# Patient Record
Sex: FEMALE | Race: WHITE | NOT HISPANIC OR LATINO | Employment: OTHER | URBAN - METROPOLITAN AREA
[De-identification: names, ages, dates, MRNs, and addresses within clinical notes are randomized per-mention and may not be internally consistent; named-entity substitution may affect disease eponyms.]

---

## 2019-01-01 ENCOUNTER — PATIENT OUTREACH (OUTPATIENT)
Dept: OTHER | Facility: MEDICAL CENTER | Age: 69
End: 2019-01-01

## 2019-01-01 ENCOUNTER — APPOINTMENT (OUTPATIENT)
Dept: RADIOLOGY | Facility: MEDICAL CENTER | Age: 69
End: 2019-01-01
Attending: EMERGENCY MEDICINE
Payer: MEDICARE

## 2019-01-01 ENCOUNTER — OUTPATIENT INFUSION SERVICES (OUTPATIENT)
Dept: ONCOLOGY | Facility: MEDICAL CENTER | Age: 69
End: 2019-01-01
Attending: INTERNAL MEDICINE
Payer: MEDICARE

## 2019-01-01 ENCOUNTER — HOSPITAL ENCOUNTER (EMERGENCY)
Facility: MEDICAL CENTER | Age: 69
End: 2019-10-02
Attending: EMERGENCY MEDICINE
Payer: MEDICARE

## 2019-01-01 ENCOUNTER — PATIENT OUTREACH (OUTPATIENT)
Dept: HEALTH INFORMATION MANAGEMENT | Facility: OTHER | Age: 69
End: 2019-01-01

## 2019-01-01 ENCOUNTER — HOSPITAL ENCOUNTER (OUTPATIENT)
Facility: MEDICAL CENTER | Age: 69
End: 2019-08-16
Attending: INTERNAL MEDICINE
Payer: MEDICARE

## 2019-01-01 ENCOUNTER — HOSPITAL ENCOUNTER (INPATIENT)
Facility: MEDICAL CENTER | Age: 69
LOS: 6 days | DRG: 683 | End: 2019-09-20
Attending: EMERGENCY MEDICINE | Admitting: HOSPITALIST
Payer: MEDICARE

## 2019-01-01 VITALS
HEIGHT: 60 IN | TEMPERATURE: 98.1 F | DIASTOLIC BLOOD PRESSURE: 60 MMHG | SYSTOLIC BLOOD PRESSURE: 131 MMHG | HEART RATE: 78 BPM | RESPIRATION RATE: 18 BRPM | BODY MASS INDEX: 20.65 KG/M2 | OXYGEN SATURATION: 98 % | WEIGHT: 105.16 LBS

## 2019-01-01 VITALS
WEIGHT: 106.26 LBS | RESPIRATION RATE: 18 BRPM | HEIGHT: 60 IN | DIASTOLIC BLOOD PRESSURE: 67 MMHG | HEART RATE: 65 BPM | SYSTOLIC BLOOD PRESSURE: 92 MMHG | BODY MASS INDEX: 20.86 KG/M2 | OXYGEN SATURATION: 98 % | TEMPERATURE: 97 F

## 2019-01-01 VITALS
TEMPERATURE: 97 F | DIASTOLIC BLOOD PRESSURE: 62 MMHG | RESPIRATION RATE: 18 BRPM | BODY MASS INDEX: 22.16 KG/M2 | HEIGHT: 60 IN | WEIGHT: 112.88 LBS | OXYGEN SATURATION: 96 % | HEART RATE: 73 BPM | SYSTOLIC BLOOD PRESSURE: 129 MMHG

## 2019-01-01 VITALS
HEART RATE: 113 BPM | HEIGHT: 60 IN | BODY MASS INDEX: 20.04 KG/M2 | SYSTOLIC BLOOD PRESSURE: 134 MMHG | DIASTOLIC BLOOD PRESSURE: 73 MMHG | TEMPERATURE: 98.8 F | OXYGEN SATURATION: 98 % | WEIGHT: 102.07 LBS | RESPIRATION RATE: 18 BRPM

## 2019-01-01 VITALS
HEART RATE: 123 BPM | BODY MASS INDEX: 21.6 KG/M2 | DIASTOLIC BLOOD PRESSURE: 49 MMHG | RESPIRATION RATE: 18 BRPM | SYSTOLIC BLOOD PRESSURE: 105 MMHG | TEMPERATURE: 97.3 F | OXYGEN SATURATION: 96 % | HEIGHT: 60 IN | WEIGHT: 110 LBS

## 2019-01-01 VITALS
HEART RATE: 101 BPM | SYSTOLIC BLOOD PRESSURE: 152 MMHG | BODY MASS INDEX: 21.64 KG/M2 | RESPIRATION RATE: 18 BRPM | OXYGEN SATURATION: 98 % | WEIGHT: 110.23 LBS | DIASTOLIC BLOOD PRESSURE: 72 MMHG | TEMPERATURE: 98 F | HEIGHT: 60 IN

## 2019-01-01 VITALS
RESPIRATION RATE: 17 BRPM | DIASTOLIC BLOOD PRESSURE: 67 MMHG | HEART RATE: 73 BPM | HEIGHT: 60 IN | BODY MASS INDEX: 21.99 KG/M2 | TEMPERATURE: 98.4 F | WEIGHT: 111.99 LBS | SYSTOLIC BLOOD PRESSURE: 140 MMHG | OXYGEN SATURATION: 96 %

## 2019-01-01 VITALS
OXYGEN SATURATION: 100 % | SYSTOLIC BLOOD PRESSURE: 128 MMHG | HEART RATE: 62 BPM | DIASTOLIC BLOOD PRESSURE: 58 MMHG | HEIGHT: 60 IN | BODY MASS INDEX: 20.3 KG/M2 | RESPIRATION RATE: 18 BRPM | TEMPERATURE: 98.4 F | WEIGHT: 103.4 LBS

## 2019-01-01 VITALS
HEART RATE: 47 BPM | DIASTOLIC BLOOD PRESSURE: 45 MMHG | SYSTOLIC BLOOD PRESSURE: 137 MMHG | OXYGEN SATURATION: 99 % | RESPIRATION RATE: 18 BRPM | WEIGHT: 108.25 LBS | HEIGHT: 60 IN | TEMPERATURE: 97.2 F | BODY MASS INDEX: 21.25 KG/M2

## 2019-01-01 VITALS
BODY MASS INDEX: 20.86 KG/M2 | OXYGEN SATURATION: 98 % | HEART RATE: 53 BPM | DIASTOLIC BLOOD PRESSURE: 56 MMHG | SYSTOLIC BLOOD PRESSURE: 118 MMHG | HEIGHT: 60 IN | WEIGHT: 106.26 LBS | RESPIRATION RATE: 18 BRPM | TEMPERATURE: 97.6 F

## 2019-01-01 VITALS
OXYGEN SATURATION: 100 % | DIASTOLIC BLOOD PRESSURE: 80 MMHG | HEIGHT: 60 IN | RESPIRATION RATE: 18 BRPM | SYSTOLIC BLOOD PRESSURE: 129 MMHG | TEMPERATURE: 98 F | HEART RATE: 121 BPM | BODY MASS INDEX: 18.74 KG/M2 | WEIGHT: 95.46 LBS

## 2019-01-01 VITALS
OXYGEN SATURATION: 100 % | DIASTOLIC BLOOD PRESSURE: 48 MMHG | BODY MASS INDEX: 20.82 KG/M2 | HEIGHT: 60 IN | RESPIRATION RATE: 18 BRPM | TEMPERATURE: 97.4 F | WEIGHT: 106.04 LBS | SYSTOLIC BLOOD PRESSURE: 164 MMHG | HEART RATE: 70 BPM

## 2019-01-01 VITALS
SYSTOLIC BLOOD PRESSURE: 148 MMHG | DIASTOLIC BLOOD PRESSURE: 55 MMHG | HEART RATE: 49 BPM | RESPIRATION RATE: 18 BRPM | TEMPERATURE: 97.9 F | OXYGEN SATURATION: 100 %

## 2019-01-01 VITALS
WEIGHT: 100.75 LBS | DIASTOLIC BLOOD PRESSURE: 70 MMHG | BODY MASS INDEX: 19.78 KG/M2 | TEMPERATURE: 98.2 F | HEART RATE: 76 BPM | SYSTOLIC BLOOD PRESSURE: 132 MMHG | OXYGEN SATURATION: 94 % | RESPIRATION RATE: 18 BRPM | HEIGHT: 60 IN

## 2019-01-01 VITALS
WEIGHT: 102.29 LBS | SYSTOLIC BLOOD PRESSURE: 118 MMHG | RESPIRATION RATE: 18 BRPM | HEIGHT: 60 IN | HEART RATE: 90 BPM | BODY MASS INDEX: 20.08 KG/M2 | OXYGEN SATURATION: 98 % | TEMPERATURE: 98 F | DIASTOLIC BLOOD PRESSURE: 46 MMHG

## 2019-01-01 DIAGNOSIS — C77.2 COLON CANCER METASTASIZED TO INTRA-ABDOMINAL LYMPH NODE (HCC): ICD-10-CM

## 2019-01-01 DIAGNOSIS — E86.0 DEHYDRATION: ICD-10-CM

## 2019-01-01 DIAGNOSIS — C18.9 COLON CANCER METASTASIZED TO INTRA-ABDOMINAL LYMPH NODE (HCC): ICD-10-CM

## 2019-01-01 DIAGNOSIS — Z85.038 PERSONAL HISTORY OF COLON CANCER, STAGE IV: ICD-10-CM

## 2019-01-01 DIAGNOSIS — R19.7 DIARRHEA, UNSPECIFIED TYPE: ICD-10-CM

## 2019-01-01 DIAGNOSIS — I95.9 HYPOTENSION, UNSPECIFIED HYPOTENSION TYPE: ICD-10-CM

## 2019-01-01 DIAGNOSIS — R11.2 INTRACTABLE VOMITING WITH NAUSEA, UNSPECIFIED VOMITING TYPE: ICD-10-CM

## 2019-01-01 DIAGNOSIS — R53.1 WEAKNESS: ICD-10-CM

## 2019-01-01 LAB
ALBUMIN SERPL BCP-MCNC: 2.5 G/DL (ref 3.2–4.9)
ALBUMIN SERPL BCP-MCNC: 2.6 G/DL (ref 3.2–4.9)
ALBUMIN SERPL BCP-MCNC: 2.7 G/DL (ref 3.2–4.9)
ALBUMIN SERPL BCP-MCNC: 2.9 G/DL (ref 3.2–4.9)
ALBUMIN SERPL BCP-MCNC: 2.9 G/DL (ref 3.2–4.9)
ALBUMIN SERPL BCP-MCNC: 3 G/DL (ref 3.2–4.9)
ALBUMIN SERPL BCP-MCNC: 3.1 G/DL (ref 3.2–4.9)
ALBUMIN SERPL BCP-MCNC: 3.3 G/DL (ref 3.2–4.9)
ALBUMIN SERPL BCP-MCNC: 3.5 G/DL (ref 3.2–4.9)
ALBUMIN SERPL BCP-MCNC: 3.6 G/DL (ref 3.2–4.9)
ALBUMIN SERPL BCP-MCNC: 3.7 G/DL (ref 3.2–4.9)
ALBUMIN SERPL BCP-MCNC: 3.8 G/DL (ref 3.2–4.9)
ALBUMIN SERPL BCP-MCNC: 3.9 G/DL (ref 3.2–4.9)
ALBUMIN SERPL BCP-MCNC: 4.5 G/DL (ref 3.2–4.9)
ALBUMIN/GLOB SERPL: 1.1 G/DL
ALBUMIN/GLOB SERPL: 1.2 G/DL
ALBUMIN/GLOB SERPL: 1.4 G/DL
ALBUMIN/GLOB SERPL: 1.5 G/DL
ALBUMIN/GLOB SERPL: 1.6 G/DL
ALBUMIN/GLOB SERPL: 1.7 G/DL
ALBUMIN/GLOB SERPL: 1.7 G/DL
ALP SERPL-CCNC: 100 U/L (ref 30–99)
ALP SERPL-CCNC: 101 U/L (ref 30–99)
ALP SERPL-CCNC: 105 U/L (ref 30–99)
ALP SERPL-CCNC: 106 U/L (ref 30–99)
ALP SERPL-CCNC: 113 U/L (ref 30–99)
ALP SERPL-CCNC: 142 U/L (ref 30–99)
ALP SERPL-CCNC: 155 U/L (ref 30–99)
ALP SERPL-CCNC: 159 U/L (ref 30–99)
ALP SERPL-CCNC: 87 U/L (ref 30–99)
ALP SERPL-CCNC: 88 U/L (ref 30–99)
ALP SERPL-CCNC: 88 U/L (ref 30–99)
ALP SERPL-CCNC: 92 U/L (ref 30–99)
ALP SERPL-CCNC: 92 U/L (ref 30–99)
ALP SERPL-CCNC: 97 U/L (ref 30–99)
ALT SERPL-CCNC: 10 U/L (ref 2–50)
ALT SERPL-CCNC: 11 U/L (ref 2–50)
ALT SERPL-CCNC: 5 U/L (ref 2–50)
ALT SERPL-CCNC: 6 U/L (ref 2–50)
ALT SERPL-CCNC: 6 U/L (ref 2–50)
ALT SERPL-CCNC: 8 U/L (ref 2–50)
ALT SERPL-CCNC: <5 U/L (ref 2–50)
ANION GAP SERPL CALC-SCNC: 10 MMOL/L (ref 0–11.9)
ANION GAP SERPL CALC-SCNC: 11 MMOL/L (ref 0–11.9)
ANION GAP SERPL CALC-SCNC: 11 MMOL/L (ref 0–11.9)
ANION GAP SERPL CALC-SCNC: 12 MMOL/L (ref 0–11.9)
ANION GAP SERPL CALC-SCNC: 12 MMOL/L (ref 0–11.9)
ANION GAP SERPL CALC-SCNC: 20 MMOL/L (ref 0–11.9)
ANION GAP SERPL CALC-SCNC: 21 MMOL/L (ref 0–11.9)
ANION GAP SERPL CALC-SCNC: 6 MMOL/L (ref 0–11.9)
ANION GAP SERPL CALC-SCNC: 7 MMOL/L (ref 0–11.9)
ANION GAP SERPL CALC-SCNC: 7 MMOL/L (ref 0–11.9)
ANION GAP SERPL CALC-SCNC: 8 MMOL/L (ref 0–11.9)
ANION GAP SERPL CALC-SCNC: 9 MMOL/L (ref 0–11.9)
ANISOCYTOSIS BLD QL SMEAR: ABNORMAL
ANISOCYTOSIS BLD QL SMEAR: ABNORMAL
APPEARANCE UR: ABNORMAL
APPEARANCE UR: CLEAR
AST SERPL-CCNC: 10 U/L (ref 12–45)
AST SERPL-CCNC: 11 U/L (ref 12–45)
AST SERPL-CCNC: 12 U/L (ref 12–45)
AST SERPL-CCNC: 12 U/L (ref 12–45)
AST SERPL-CCNC: 14 U/L (ref 12–45)
AST SERPL-CCNC: 15 U/L (ref 12–45)
AST SERPL-CCNC: 16 U/L (ref 12–45)
AST SERPL-CCNC: 17 U/L (ref 12–45)
AST SERPL-CCNC: 9 U/L (ref 12–45)
AST SERPL-CCNC: 9 U/L (ref 12–45)
B-OH-BUTYR SERPL-MCNC: 2.47 MMOL/L (ref 0.02–0.27)
BASOPHILS # BLD AUTO: 0.4 % (ref 0–1.8)
BASOPHILS # BLD AUTO: 0.6 % (ref 0–1.8)
BASOPHILS # BLD AUTO: 0.6 % (ref 0–1.8)
BASOPHILS # BLD AUTO: 0.8 % (ref 0–1.8)
BASOPHILS # BLD AUTO: 0.8 % (ref 0–1.8)
BASOPHILS # BLD AUTO: 0.9 % (ref 0–1.8)
BASOPHILS # BLD AUTO: 0.9 % (ref 0–1.8)
BASOPHILS # BLD AUTO: 1 % (ref 0–1.8)
BASOPHILS # BLD AUTO: 1.7 % (ref 0–1.8)
BASOPHILS # BLD: 0.03 K/UL (ref 0–0.12)
BASOPHILS # BLD: 0.05 K/UL (ref 0–0.12)
BASOPHILS # BLD: 0.06 K/UL (ref 0–0.12)
BASOPHILS # BLD: 0.06 K/UL (ref 0–0.12)
BASOPHILS # BLD: 0.08 K/UL (ref 0–0.12)
BASOPHILS # BLD: 0.08 K/UL (ref 0–0.12)
BILIRUB SERPL-MCNC: 0.3 MG/DL (ref 0.1–1.5)
BILIRUB SERPL-MCNC: 0.4 MG/DL (ref 0.1–1.5)
BILIRUB SERPL-MCNC: 0.6 MG/DL (ref 0.1–1.5)
BUN SERPL-MCNC: 10 MG/DL (ref 8–22)
BUN SERPL-MCNC: 15 MG/DL (ref 8–22)
BUN SERPL-MCNC: 16 MG/DL (ref 8–22)
BUN SERPL-MCNC: 17 MG/DL (ref 8–22)
BUN SERPL-MCNC: 19 MG/DL (ref 8–22)
BUN SERPL-MCNC: 29 MG/DL (ref 8–22)
BUN SERPL-MCNC: 3 MG/DL (ref 8–22)
BUN SERPL-MCNC: 30 MG/DL (ref 8–22)
BUN SERPL-MCNC: 4 MG/DL (ref 8–22)
BUN SERPL-MCNC: 53 MG/DL (ref 8–22)
BUN SERPL-MCNC: 77 MG/DL (ref 8–22)
BUN SERPL-MCNC: 8 MG/DL (ref 8–22)
BUN SERPL-MCNC: <3 MG/DL (ref 8–22)
BUN SERPL-MCNC: <3 MG/DL (ref 8–22)
BURR CELLS BLD QL SMEAR: NORMAL
CALCIUM SERPL-MCNC: 7.2 MG/DL (ref 8.5–10.5)
CALCIUM SERPL-MCNC: 7.3 MG/DL (ref 8.5–10.5)
CALCIUM SERPL-MCNC: 7.4 MG/DL (ref 8.5–10.5)
CALCIUM SERPL-MCNC: 7.4 MG/DL (ref 8.5–10.5)
CALCIUM SERPL-MCNC: 7.5 MG/DL (ref 8.5–10.5)
CALCIUM SERPL-MCNC: 7.6 MG/DL (ref 8.5–10.5)
CALCIUM SERPL-MCNC: 7.8 MG/DL (ref 8.5–10.5)
CALCIUM SERPL-MCNC: 8.1 MG/DL (ref 8.5–10.5)
CALCIUM SERPL-MCNC: 8.8 MG/DL (ref 8.5–10.5)
CALCIUM SERPL-MCNC: 8.9 MG/DL (ref 8.5–10.5)
CALCIUM SERPL-MCNC: 8.9 MG/DL (ref 8.5–10.5)
CALCIUM SERPL-MCNC: 9.4 MG/DL (ref 8.5–10.5)
CALCIUM SERPL-MCNC: 9.5 MG/DL (ref 8.5–10.5)
CALCIUM SERPL-MCNC: 9.7 MG/DL (ref 8.5–10.5)
CHLORIDE SERPL-SCNC: 103 MMOL/L (ref 96–112)
CHLORIDE SERPL-SCNC: 103 MMOL/L (ref 96–112)
CHLORIDE SERPL-SCNC: 104 MMOL/L (ref 96–112)
CHLORIDE SERPL-SCNC: 106 MMOL/L (ref 96–112)
CHLORIDE SERPL-SCNC: 107 MMOL/L (ref 96–112)
CHLORIDE SERPL-SCNC: 107 MMOL/L (ref 96–112)
CHLORIDE SERPL-SCNC: 108 MMOL/L (ref 96–112)
CHLORIDE SERPL-SCNC: 109 MMOL/L (ref 96–112)
CHLORIDE SERPL-SCNC: 109 MMOL/L (ref 96–112)
CHLORIDE SERPL-SCNC: 110 MMOL/L (ref 96–112)
CHLORIDE SERPL-SCNC: 110 MMOL/L (ref 96–112)
CHLORIDE SERPL-SCNC: 94 MMOL/L (ref 96–112)
CHLORIDE SERPL-SCNC: 97 MMOL/L (ref 96–112)
CHLORIDE SERPL-SCNC: 98 MMOL/L (ref 96–112)
CO2 SERPL-SCNC: 16 MMOL/L (ref 20–33)
CO2 SERPL-SCNC: 16 MMOL/L (ref 20–33)
CO2 SERPL-SCNC: 17 MMOL/L (ref 20–33)
CO2 SERPL-SCNC: 18 MMOL/L (ref 20–33)
CO2 SERPL-SCNC: 20 MMOL/L (ref 20–33)
CO2 SERPL-SCNC: 21 MMOL/L (ref 20–33)
CO2 SERPL-SCNC: 22 MMOL/L (ref 20–33)
CO2 SERPL-SCNC: 22 MMOL/L (ref 20–33)
CO2 SERPL-SCNC: 23 MMOL/L (ref 20–33)
CO2 SERPL-SCNC: 24 MMOL/L (ref 20–33)
COLLECT DURATION TIME SPEC: 24 HRS
COLOR UR AUTO: ABNORMAL
COLOR UR AUTO: ABNORMAL
COLOR UR AUTO: YELLOW
COMMENT 1642: NORMAL
CREAT 24H UR-MCNC: 54 MG/DL
CREAT 24H UR-MRATE: 675 MG/D (ref 500–1400)
CREAT SERPL-MCNC: 0.37 MG/DL (ref 0.5–1.4)
CREAT SERPL-MCNC: 0.39 MG/DL (ref 0.5–1.4)
CREAT SERPL-MCNC: 0.41 MG/DL (ref 0.5–1.4)
CREAT SERPL-MCNC: 0.44 MG/DL (ref 0.5–1.4)
CREAT SERPL-MCNC: 0.51 MG/DL (ref 0.5–1.4)
CREAT SERPL-MCNC: 0.51 MG/DL (ref 0.5–1.4)
CREAT SERPL-MCNC: 0.56 MG/DL (ref 0.5–1.4)
CREAT SERPL-MCNC: 0.59 MG/DL (ref 0.5–1.4)
CREAT SERPL-MCNC: 0.62 MG/DL (ref 0.5–1.4)
CREAT SERPL-MCNC: 0.71 MG/DL (ref 0.5–1.4)
CREAT SERPL-MCNC: 0.74 MG/DL (ref 0.5–1.4)
CREAT SERPL-MCNC: 0.98 MG/DL (ref 0.5–1.4)
CREAT SERPL-MCNC: 1.01 MG/DL (ref 0.5–1.4)
CREAT SERPL-MCNC: 1.61 MG/DL (ref 0.5–1.4)
EKG IMPRESSION: NORMAL
EKG IMPRESSION: NORMAL
EOSINOPHIL # BLD AUTO: 0 K/UL (ref 0–0.51)
EOSINOPHIL # BLD AUTO: 0.01 K/UL (ref 0–0.51)
EOSINOPHIL # BLD AUTO: 0.04 K/UL (ref 0–0.51)
EOSINOPHIL # BLD AUTO: 0.04 K/UL (ref 0–0.51)
EOSINOPHIL # BLD AUTO: 0.1 K/UL (ref 0–0.51)
EOSINOPHIL # BLD AUTO: 0.1 K/UL (ref 0–0.51)
EOSINOPHIL # BLD AUTO: 0.14 K/UL (ref 0–0.51)
EOSINOPHIL # BLD AUTO: 0.16 K/UL (ref 0–0.51)
EOSINOPHIL # BLD AUTO: 0.3 K/UL (ref 0–0.51)
EOSINOPHIL NFR BLD: 0 % (ref 0–6.9)
EOSINOPHIL NFR BLD: 0.1 % (ref 0–6.9)
EOSINOPHIL NFR BLD: 0.5 % (ref 0–6.9)
EOSINOPHIL NFR BLD: 0.6 % (ref 0–6.9)
EOSINOPHIL NFR BLD: 1.5 % (ref 0–6.9)
EOSINOPHIL NFR BLD: 1.7 % (ref 0–6.9)
EOSINOPHIL NFR BLD: 1.8 % (ref 0–6.9)
EOSINOPHIL NFR BLD: 3.5 % (ref 0–6.9)
EOSINOPHIL NFR BLD: 5.4 % (ref 0–6.9)
ERYTHROCYTE [DISTWIDTH] IN BLOOD BY AUTOMATED COUNT: 54.6 FL (ref 35.9–50)
ERYTHROCYTE [DISTWIDTH] IN BLOOD BY AUTOMATED COUNT: 55.1 FL (ref 35.9–50)
ERYTHROCYTE [DISTWIDTH] IN BLOOD BY AUTOMATED COUNT: 55.8 FL (ref 35.9–50)
ERYTHROCYTE [DISTWIDTH] IN BLOOD BY AUTOMATED COUNT: 57.1 FL (ref 35.9–50)
ERYTHROCYTE [DISTWIDTH] IN BLOOD BY AUTOMATED COUNT: 57.2 FL (ref 35.9–50)
ERYTHROCYTE [DISTWIDTH] IN BLOOD BY AUTOMATED COUNT: 59.5 FL (ref 35.9–50)
ERYTHROCYTE [DISTWIDTH] IN BLOOD BY AUTOMATED COUNT: 59.8 FL (ref 35.9–50)
ERYTHROCYTE [DISTWIDTH] IN BLOOD BY AUTOMATED COUNT: 60.9 FL (ref 35.9–50)
ERYTHROCYTE [DISTWIDTH] IN BLOOD BY AUTOMATED COUNT: 61.2 FL (ref 35.9–50)
ERYTHROCYTE [DISTWIDTH] IN BLOOD BY AUTOMATED COUNT: 61.4 FL (ref 35.9–50)
ERYTHROCYTE [DISTWIDTH] IN BLOOD BY AUTOMATED COUNT: 61.7 FL (ref 35.9–50)
ERYTHROCYTE [DISTWIDTH] IN BLOOD BY AUTOMATED COUNT: 63.7 FL (ref 35.9–50)
ERYTHROCYTE [DISTWIDTH] IN BLOOD BY AUTOMATED COUNT: 65.9 FL (ref 35.9–50)
ERYTHROCYTE [DISTWIDTH] IN BLOOD BY AUTOMATED COUNT: 66.6 FL (ref 35.9–50)
FOLATE SERPL-MCNC: >22.4 NG/ML
GLOBULIN SER CALC-MCNC: 1.7 G/DL (ref 1.9–3.5)
GLOBULIN SER CALC-MCNC: 1.8 G/DL (ref 1.9–3.5)
GLOBULIN SER CALC-MCNC: 1.8 G/DL (ref 1.9–3.5)
GLOBULIN SER CALC-MCNC: 1.9 G/DL (ref 1.9–3.5)
GLOBULIN SER CALC-MCNC: 2 G/DL (ref 1.9–3.5)
GLOBULIN SER CALC-MCNC: 2.1 G/DL (ref 1.9–3.5)
GLOBULIN SER CALC-MCNC: 2.6 G/DL (ref 1.9–3.5)
GLOBULIN SER CALC-MCNC: 2.9 G/DL (ref 1.9–3.5)
GLOBULIN SER CALC-MCNC: 3 G/DL (ref 1.9–3.5)
GLOBULIN SER CALC-MCNC: 3.2 G/DL (ref 1.9–3.5)
GLOBULIN SER CALC-MCNC: 3.2 G/DL (ref 1.9–3.5)
GLOBULIN SER CALC-MCNC: 3.4 G/DL (ref 1.9–3.5)
GLUCOSE SERPL-MCNC: 103 MG/DL (ref 65–99)
GLUCOSE SERPL-MCNC: 106 MG/DL (ref 65–99)
GLUCOSE SERPL-MCNC: 107 MG/DL (ref 65–99)
GLUCOSE SERPL-MCNC: 112 MG/DL (ref 65–99)
GLUCOSE SERPL-MCNC: 133 MG/DL (ref 65–99)
GLUCOSE SERPL-MCNC: 145 MG/DL (ref 65–99)
GLUCOSE SERPL-MCNC: 167 MG/DL (ref 65–99)
GLUCOSE SERPL-MCNC: 63 MG/DL (ref 65–99)
GLUCOSE SERPL-MCNC: 64 MG/DL (ref 65–99)
GLUCOSE SERPL-MCNC: 68 MG/DL (ref 65–99)
GLUCOSE SERPL-MCNC: 68 MG/DL (ref 65–99)
GLUCOSE SERPL-MCNC: 69 MG/DL (ref 65–99)
GLUCOSE SERPL-MCNC: 70 MG/DL (ref 65–99)
GLUCOSE SERPL-MCNC: 76 MG/DL (ref 65–99)
GLUCOSE UR QL STRIP.AUTO: NEGATIVE MG/DL
HCT VFR BLD AUTO: 25.9 % (ref 37–47)
HCT VFR BLD AUTO: 31.1 % (ref 37–47)
HCT VFR BLD AUTO: 31.2 % (ref 37–47)
HCT VFR BLD AUTO: 32.2 % (ref 37–47)
HCT VFR BLD AUTO: 32.4 % (ref 37–47)
HCT VFR BLD AUTO: 32.5 % (ref 37–47)
HCT VFR BLD AUTO: 32.9 % (ref 37–47)
HCT VFR BLD AUTO: 33.6 % (ref 37–47)
HCT VFR BLD AUTO: 35.6 % (ref 37–47)
HCT VFR BLD AUTO: 35.9 % (ref 37–47)
HCT VFR BLD AUTO: 36.4 % (ref 37–47)
HCT VFR BLD AUTO: 37 % (ref 37–47)
HCT VFR BLD AUTO: 38.6 % (ref 37–47)
HCT VFR BLD AUTO: 43.9 % (ref 37–47)
HGB BLD-MCNC: 10.2 G/DL (ref 12–16)
HGB BLD-MCNC: 10.5 G/DL (ref 12–16)
HGB BLD-MCNC: 10.5 G/DL (ref 12–16)
HGB BLD-MCNC: 10.8 G/DL (ref 12–16)
HGB BLD-MCNC: 11 G/DL (ref 12–16)
HGB BLD-MCNC: 11.1 G/DL (ref 12–16)
HGB BLD-MCNC: 11.4 G/DL (ref 12–16)
HGB BLD-MCNC: 11.4 G/DL (ref 12–16)
HGB BLD-MCNC: 11.5 G/DL (ref 12–16)
HGB BLD-MCNC: 11.8 G/DL (ref 12–16)
HGB BLD-MCNC: 12.3 G/DL (ref 12–16)
HGB BLD-MCNC: 12.4 G/DL (ref 12–16)
HGB BLD-MCNC: 14.5 G/DL (ref 12–16)
HGB BLD-MCNC: 8.1 G/DL (ref 12–16)
IMM GRANULOCYTES # BLD AUTO: 0 K/UL (ref 0–0.11)
IMM GRANULOCYTES # BLD AUTO: 0.02 K/UL (ref 0–0.11)
IMM GRANULOCYTES # BLD AUTO: 0.03 K/UL (ref 0–0.11)
IMM GRANULOCYTES # BLD AUTO: 0.04 K/UL (ref 0–0.11)
IMM GRANULOCYTES # BLD AUTO: 0.09 K/UL (ref 0–0.11)
IMM GRANULOCYTES NFR BLD AUTO: 0 % (ref 0–0.9)
IMM GRANULOCYTES NFR BLD AUTO: 0.2 % (ref 0–0.9)
IMM GRANULOCYTES NFR BLD AUTO: 0.3 % (ref 0–0.9)
IMM GRANULOCYTES NFR BLD AUTO: 0.3 % (ref 0–0.9)
IMM GRANULOCYTES NFR BLD AUTO: 0.4 % (ref 0–0.9)
IMM GRANULOCYTES NFR BLD AUTO: 1.3 % (ref 0–0.9)
IRON SATN MFR SERPL: 20 % (ref 15–55)
IRON SERPL-MCNC: 51 UG/DL (ref 40–170)
KETONES UR QL STRIP.AUTO: ABNORMAL MG/DL
KETONES UR QL STRIP.AUTO: ABNORMAL MG/DL
KETONES UR QL STRIP.AUTO: NEGATIVE MG/DL
LACTATE BLD-SCNC: 1.4 MMOL/L (ref 0.5–2)
LACTATE BLD-SCNC: 5 MMOL/L (ref 0.5–2)
LEUKOCYTE ESTERASE UR QL STRIP.AUTO: NEGATIVE
LG PLATELETS BLD QL SMEAR: NORMAL
LIPASE SERPL-CCNC: 20 U/L (ref 11–82)
LYMPHOCYTES # BLD AUTO: 0.85 K/UL (ref 1–4.8)
LYMPHOCYTES # BLD AUTO: 1.82 K/UL (ref 1–4.8)
LYMPHOCYTES # BLD AUTO: 2.03 K/UL (ref 1–4.8)
LYMPHOCYTES # BLD AUTO: 2.05 K/UL (ref 1–4.8)
LYMPHOCYTES # BLD AUTO: 2.49 K/UL (ref 1–4.8)
LYMPHOCYTES # BLD AUTO: 2.53 K/UL (ref 1–4.8)
LYMPHOCYTES # BLD AUTO: 2.55 K/UL (ref 1–4.8)
LYMPHOCYTES # BLD AUTO: 2.64 K/UL (ref 1–4.8)
LYMPHOCYTES # BLD AUTO: 2.67 K/UL (ref 1–4.8)
LYMPHOCYTES NFR BLD: 12.4 % (ref 22–41)
LYMPHOCYTES NFR BLD: 20.3 % (ref 22–41)
LYMPHOCYTES NFR BLD: 30.5 % (ref 22–41)
LYMPHOCYTES NFR BLD: 31.8 % (ref 22–41)
LYMPHOCYTES NFR BLD: 32 % (ref 22–41)
LYMPHOCYTES NFR BLD: 35.4 % (ref 22–41)
LYMPHOCYTES NFR BLD: 39.5 % (ref 22–41)
LYMPHOCYTES NFR BLD: 44.9 % (ref 22–41)
LYMPHOCYTES NFR BLD: 47.8 % (ref 22–41)
MACROCYTES BLD QL SMEAR: ABNORMAL
MAGNESIUM SERPL-MCNC: 1.3 MG/DL (ref 1.5–2.5)
MAGNESIUM SERPL-MCNC: 1.6 MG/DL (ref 1.5–2.5)
MAGNESIUM SERPL-MCNC: 1.7 MG/DL (ref 1.5–2.5)
MAGNESIUM SERPL-MCNC: 2 MG/DL (ref 1.5–2.5)
MAGNESIUM SERPL-MCNC: 2.5 MG/DL (ref 1.5–2.5)
MANUAL DIFF BLD: NORMAL
MCH RBC QN AUTO: 27 PG (ref 27–33)
MCH RBC QN AUTO: 27.2 PG (ref 27–33)
MCH RBC QN AUTO: 27.6 PG (ref 27–33)
MCH RBC QN AUTO: 28.9 PG (ref 27–33)
MCH RBC QN AUTO: 29 PG (ref 27–33)
MCH RBC QN AUTO: 29 PG (ref 27–33)
MCH RBC QN AUTO: 29.1 PG (ref 27–33)
MCH RBC QN AUTO: 29.2 PG (ref 27–33)
MCH RBC QN AUTO: 29.9 PG (ref 27–33)
MCH RBC QN AUTO: 30 PG (ref 27–33)
MCH RBC QN AUTO: 30.3 PG (ref 27–33)
MCH RBC QN AUTO: 30.8 PG (ref 27–33)
MCH RBC QN AUTO: 31 PG (ref 27–33)
MCH RBC QN AUTO: 31.1 PG (ref 27–33)
MCHC RBC AUTO-ENTMCNC: 31.3 G/DL (ref 33.6–35)
MCHC RBC AUTO-ENTMCNC: 31.8 G/DL (ref 33.6–35)
MCHC RBC AUTO-ENTMCNC: 31.9 G/DL (ref 33.6–35)
MCHC RBC AUTO-ENTMCNC: 32.3 G/DL (ref 33.6–35)
MCHC RBC AUTO-ENTMCNC: 32.4 G/DL (ref 33.6–35)
MCHC RBC AUTO-ENTMCNC: 32.4 G/DL (ref 33.6–35)
MCHC RBC AUTO-ENTMCNC: 32.8 G/DL (ref 33.6–35)
MCHC RBC AUTO-ENTMCNC: 33 G/DL (ref 33.6–35)
MCHC RBC AUTO-ENTMCNC: 33.2 G/DL (ref 33.6–35)
MCHC RBC AUTO-ENTMCNC: 33.4 G/DL (ref 33.6–35)
MCHC RBC AUTO-ENTMCNC: 33.5 G/DL (ref 33.6–35)
MCHC RBC AUTO-ENTMCNC: 33.7 G/DL (ref 33.6–35)
MCHC RBC AUTO-ENTMCNC: 33.9 G/DL (ref 33.6–35)
MCHC RBC AUTO-ENTMCNC: 34.5 G/DL (ref 33.6–35)
MCV RBC AUTO: 83.6 FL (ref 81.4–97.8)
MCV RBC AUTO: 85.7 FL (ref 81.4–97.8)
MCV RBC AUTO: 86.4 FL (ref 81.4–97.8)
MCV RBC AUTO: 86.7 FL (ref 81.4–97.8)
MCV RBC AUTO: 87.6 FL (ref 81.4–97.8)
MCV RBC AUTO: 88.9 FL (ref 81.4–97.8)
MCV RBC AUTO: 89.4 FL (ref 81.4–97.8)
MCV RBC AUTO: 89.5 FL (ref 81.4–97.8)
MCV RBC AUTO: 89.6 FL (ref 81.4–97.8)
MCV RBC AUTO: 90 FL (ref 81.4–97.8)
MCV RBC AUTO: 90.1 FL (ref 81.4–97.8)
MCV RBC AUTO: 90.2 FL (ref 81.4–97.8)
MCV RBC AUTO: 91.5 FL (ref 81.4–97.8)
MCV RBC AUTO: 99.2 FL (ref 81.4–97.8)
MICROALBUMIN 24H UR-MCNC: 0.6 MG/DL
MICROALBUMIN/CREAT 24H UR: 11 MG/G (ref 0–30)
MONOCYTES # BLD AUTO: 0.22 K/UL (ref 0–0.85)
MONOCYTES # BLD AUTO: 0.35 K/UL (ref 0–0.85)
MONOCYTES # BLD AUTO: 0.36 K/UL (ref 0–0.85)
MONOCYTES # BLD AUTO: 0.48 K/UL (ref 0–0.85)
MONOCYTES # BLD AUTO: 0.5 K/UL (ref 0–0.85)
MONOCYTES # BLD AUTO: 0.5 K/UL (ref 0–0.85)
MONOCYTES # BLD AUTO: 0.51 K/UL (ref 0–0.85)
MONOCYTES # BLD AUTO: 0.54 K/UL (ref 0–0.85)
MONOCYTES # BLD AUTO: 0.55 K/UL (ref 0–0.85)
MONOCYTES NFR BLD AUTO: 3.2 % (ref 0–13.4)
MONOCYTES NFR BLD AUTO: 4.8 % (ref 0–13.4)
MONOCYTES NFR BLD AUTO: 6.3 % (ref 0–13.4)
MONOCYTES NFR BLD AUTO: 6.4 % (ref 0–13.4)
MONOCYTES NFR BLD AUTO: 6.5 % (ref 0–13.4)
MONOCYTES NFR BLD AUTO: 7.5 % (ref 0–13.4)
MONOCYTES NFR BLD AUTO: 7.6 % (ref 0–13.4)
MONOCYTES NFR BLD AUTO: 7.9 % (ref 0–13.4)
MONOCYTES NFR BLD AUTO: 9.1 % (ref 0–13.4)
MORPHOLOGY BLD-IMP: NORMAL
MORPHOLOGY BLD-IMP: NORMAL
NEUTROPHILS # BLD AUTO: 2.18 K/UL (ref 2–7.15)
NEUTROPHILS # BLD AUTO: 2.21 K/UL (ref 2–7.15)
NEUTROPHILS # BLD AUTO: 2.42 K/UL (ref 2–7.15)
NEUTROPHILS # BLD AUTO: 3.95 K/UL (ref 2–7.15)
NEUTROPHILS # BLD AUTO: 3.97 K/UL (ref 2–7.15)
NEUTROPHILS # BLD AUTO: 4.68 K/UL (ref 2–7.15)
NEUTROPHILS # BLD AUTO: 4.88 K/UL (ref 2–7.15)
NEUTROPHILS # BLD AUTO: 5.67 K/UL (ref 2–7.15)
NEUTROPHILS # BLD AUTO: 7.44 K/UL (ref 2–7.15)
NEUTROPHILS NFR BLD: 39.6 % (ref 44–72)
NEUTROPHILS NFR BLD: 42.9 % (ref 44–72)
NEUTROPHILS NFR BLD: 45.6 % (ref 44–72)
NEUTROPHILS NFR BLD: 55.2 % (ref 44–72)
NEUTROPHILS NFR BLD: 58.8 % (ref 44–72)
NEUTROPHILS NFR BLD: 59.4 % (ref 44–72)
NEUTROPHILS NFR BLD: 60.2 % (ref 44–72)
NEUTROPHILS NFR BLD: 73.8 % (ref 44–72)
NEUTROPHILS NFR BLD: 82.7 % (ref 44–72)
NEUTS BAND NFR BLD MANUAL: 1.8 % (ref 0–10)
NITRITE UR QL STRIP.AUTO: NEGATIVE
NRBC # BLD AUTO: 0 K/UL
NRBC # BLD AUTO: 0.02 K/UL
NRBC BLD-RTO: 0 /100 WBC
NRBC BLD-RTO: 0.3 /100 WBC
OVALOCYTES BLD QL SMEAR: NORMAL
OVALOCYTES BLD QL SMEAR: NORMAL
PH UR STRIP.AUTO: 5.5 [PH] (ref 5–8)
PH UR STRIP.AUTO: 5.5 [PH] (ref 5–8)
PH UR STRIP.AUTO: 6 [PH]
PH UR STRIP.AUTO: 6 [PH] (ref 5–8)
PLATELET # BLD AUTO: 158 K/UL (ref 164–446)
PLATELET # BLD AUTO: 198 K/UL (ref 164–446)
PLATELET # BLD AUTO: 205 K/UL (ref 164–446)
PLATELET # BLD AUTO: 225 K/UL (ref 164–446)
PLATELET # BLD AUTO: 226 K/UL (ref 164–446)
PLATELET # BLD AUTO: 240 K/UL (ref 164–446)
PLATELET # BLD AUTO: 255 K/UL (ref 164–446)
PLATELET # BLD AUTO: 255 K/UL (ref 164–446)
PLATELET # BLD AUTO: 259 K/UL (ref 164–446)
PLATELET # BLD AUTO: 283 K/UL (ref 164–446)
PLATELET # BLD AUTO: 287 K/UL (ref 164–446)
PLATELET # BLD AUTO: 289 K/UL (ref 164–446)
PLATELET # BLD AUTO: 293 K/UL (ref 164–446)
PLATELET # BLD AUTO: 318 K/UL (ref 164–446)
PLATELET BLD QL SMEAR: NORMAL
PLATELET BLD QL SMEAR: NORMAL
PMV BLD AUTO: 10.3 FL (ref 9–12.9)
PMV BLD AUTO: 8.8 FL (ref 9–12.9)
PMV BLD AUTO: 8.9 FL (ref 9–12.9)
PMV BLD AUTO: 9 FL (ref 9–12.9)
PMV BLD AUTO: 9 FL (ref 9–12.9)
PMV BLD AUTO: 9.1 FL (ref 9–12.9)
PMV BLD AUTO: 9.1 FL (ref 9–12.9)
PMV BLD AUTO: 9.2 FL (ref 9–12.9)
PMV BLD AUTO: 9.2 FL (ref 9–12.9)
PMV BLD AUTO: 9.3 FL (ref 9–12.9)
PMV BLD AUTO: 9.3 FL (ref 9–12.9)
PMV BLD AUTO: 9.4 FL (ref 9–12.9)
PMV BLD AUTO: 9.5 FL (ref 9–12.9)
PMV BLD AUTO: 9.6 FL (ref 9–12.9)
POIKILOCYTOSIS BLD QL SMEAR: NORMAL
POIKILOCYTOSIS BLD QL SMEAR: NORMAL
POLYCHROMASIA BLD QL SMEAR: NORMAL
POTASSIUM SERPL-SCNC: 2.5 MMOL/L (ref 3.6–5.5)
POTASSIUM SERPL-SCNC: 3.2 MMOL/L (ref 3.6–5.5)
POTASSIUM SERPL-SCNC: 3.3 MMOL/L (ref 3.6–5.5)
POTASSIUM SERPL-SCNC: 3.6 MMOL/L (ref 3.6–5.5)
POTASSIUM SERPL-SCNC: 3.6 MMOL/L (ref 3.6–5.5)
POTASSIUM SERPL-SCNC: 3.7 MMOL/L (ref 3.6–5.5)
POTASSIUM SERPL-SCNC: 3.7 MMOL/L (ref 3.6–5.5)
POTASSIUM SERPL-SCNC: 3.8 MMOL/L (ref 3.6–5.5)
POTASSIUM SERPL-SCNC: 3.8 MMOL/L (ref 3.6–5.5)
POTASSIUM SERPL-SCNC: 3.9 MMOL/L (ref 3.6–5.5)
POTASSIUM SERPL-SCNC: 4.2 MMOL/L (ref 3.6–5.5)
PROT SERPL-MCNC: 4.2 G/DL (ref 6–8.2)
PROT SERPL-MCNC: 4.5 G/DL (ref 6–8.2)
PROT SERPL-MCNC: 4.7 G/DL (ref 6–8.2)
PROT SERPL-MCNC: 4.7 G/DL (ref 6–8.2)
PROT SERPL-MCNC: 4.9 G/DL (ref 6–8.2)
PROT SERPL-MCNC: 5 G/DL (ref 6–8.2)
PROT SERPL-MCNC: 5 G/DL (ref 6–8.2)
PROT SERPL-MCNC: 5.3 G/DL (ref 6–8.2)
PROT SERPL-MCNC: 6.2 G/DL (ref 6–8.2)
PROT SERPL-MCNC: 6.4 G/DL (ref 6–8.2)
PROT SERPL-MCNC: 6.9 G/DL (ref 6–8.2)
PROT SERPL-MCNC: 7 G/DL (ref 6–8.2)
PROT SERPL-MCNC: 7.3 G/DL (ref 6–8.2)
PROT SERPL-MCNC: 7.5 G/DL (ref 6–8.2)
PROT UR QL STRIP: 100 MG/DL
PROT UR QL STRIP: 100 MG/DL
PROT UR QL STRIP: 30 MG/DL
RBC # BLD AUTO: 2.61 M/UL (ref 4.2–5.4)
RBC # BLD AUTO: 3.41 M/UL (ref 4.2–5.4)
RBC # BLD AUTO: 3.5 M/UL (ref 4.2–5.4)
RBC # BLD AUTO: 3.57 M/UL (ref 4.2–5.4)
RBC # BLD AUTO: 3.61 M/UL (ref 4.2–5.4)
RBC # BLD AUTO: 3.62 M/UL (ref 4.2–5.4)
RBC # BLD AUTO: 3.67 M/UL (ref 4.2–5.4)
RBC # BLD AUTO: 3.76 M/UL (ref 4.2–5.4)
RBC # BLD AUTO: 4.04 M/UL (ref 4.2–5.4)
RBC # BLD AUTO: 4.19 M/UL (ref 4.2–5.4)
RBC # BLD AUTO: 4.26 M/UL (ref 4.2–5.4)
RBC # BLD AUTO: 4.28 M/UL (ref 4.2–5.4)
RBC # BLD AUTO: 4.45 M/UL (ref 4.2–5.4)
RBC # BLD AUTO: 5.01 M/UL (ref 4.2–5.4)
RBC BLD AUTO: PRESENT
RBC BLD AUTO: PRESENT
RBC UR QL AUTO: NEGATIVE
SMUDGE CELLS BLD QL SMEAR: NORMAL
SODIUM SERPL-SCNC: 131 MMOL/L (ref 135–145)
SODIUM SERPL-SCNC: 131 MMOL/L (ref 135–145)
SODIUM SERPL-SCNC: 133 MMOL/L (ref 135–145)
SODIUM SERPL-SCNC: 134 MMOL/L (ref 135–145)
SODIUM SERPL-SCNC: 135 MMOL/L (ref 135–145)
SODIUM SERPL-SCNC: 135 MMOL/L (ref 135–145)
SODIUM SERPL-SCNC: 136 MMOL/L (ref 135–145)
SODIUM SERPL-SCNC: 137 MMOL/L (ref 135–145)
SODIUM SERPL-SCNC: 137 MMOL/L (ref 135–145)
SODIUM SERPL-SCNC: 139 MMOL/L (ref 135–145)
SP GR UR: 1.02
SP GR UR: >=1.03 (ref 1–1.03)
SPECIMEN VOL ?TM UR: 1250 ML
TIBC SERPL-MCNC: 259 UG/DL (ref 250–450)
VIT B12 SERPL-MCNC: 166 PG/ML (ref 211–911)
WBC # BLD AUTO: 10.1 K/UL (ref 4.8–10.8)
WBC # BLD AUTO: 4.6 K/UL (ref 4.8–10.8)
WBC # BLD AUTO: 5 K/UL (ref 4.8–10.8)
WBC # BLD AUTO: 5.6 K/UL (ref 4.8–10.8)
WBC # BLD AUTO: 5.6 K/UL (ref 4.8–10.8)
WBC # BLD AUTO: 6 K/UL (ref 4.8–10.8)
WBC # BLD AUTO: 6.7 K/UL (ref 4.8–10.8)
WBC # BLD AUTO: 6.7 K/UL (ref 4.8–10.8)
WBC # BLD AUTO: 6.9 K/UL (ref 4.8–10.8)
WBC # BLD AUTO: 7.2 K/UL (ref 4.8–10.8)
WBC # BLD AUTO: 7.5 K/UL (ref 4.8–10.8)
WBC # BLD AUTO: 7.8 K/UL (ref 4.8–10.8)
WBC # BLD AUTO: 8 K/UL (ref 4.8–10.8)
WBC # BLD AUTO: 8.3 K/UL (ref 4.8–10.8)

## 2019-01-01 PROCEDURE — 83605 ASSAY OF LACTIC ACID: CPT

## 2019-01-01 PROCEDURE — 85025 COMPLETE CBC W/AUTO DIFF WBC: CPT

## 2019-01-01 PROCEDURE — A9270 NON-COVERED ITEM OR SERVICE: HCPCS | Performed by: HOSPITALIST

## 2019-01-01 PROCEDURE — 700105 HCHG RX REV CODE 258: Performed by: HOSPITALIST

## 2019-01-01 PROCEDURE — 700105 HCHG RX REV CODE 258: Performed by: INTERNAL MEDICINE

## 2019-01-01 PROCEDURE — 99232 SBSQ HOSP IP/OBS MODERATE 35: CPT | Performed by: FAMILY MEDICINE

## 2019-01-01 PROCEDURE — 80053 COMPREHEN METABOLIC PANEL: CPT

## 2019-01-01 PROCEDURE — 71045 X-RAY EXAM CHEST 1 VIEW: CPT

## 2019-01-01 PROCEDURE — 700111 HCHG RX REV CODE 636 W/ 250 OVERRIDE (IP): Performed by: HOSPITALIST

## 2019-01-01 PROCEDURE — 99285 EMERGENCY DEPT VISIT HI MDM: CPT

## 2019-01-01 PROCEDURE — 700111 HCHG RX REV CODE 636 W/ 250 OVERRIDE (IP): Performed by: INTERNAL MEDICINE

## 2019-01-01 PROCEDURE — G0378 HOSPITAL OBSERVATION PER HR: HCPCS

## 2019-01-01 PROCEDURE — 83735 ASSAY OF MAGNESIUM: CPT

## 2019-01-01 PROCEDURE — 93005 ELECTROCARDIOGRAM TRACING: CPT | Performed by: HOSPITALIST

## 2019-01-01 PROCEDURE — 96411 CHEMO IV PUSH ADDL DRUG: CPT

## 2019-01-01 PROCEDURE — 99239 HOSP IP/OBS DSCHRG MGMT >30: CPT | Performed by: FAMILY MEDICINE

## 2019-01-01 PROCEDURE — 81002 URINALYSIS NONAUTO W/O SCOPE: CPT

## 2019-01-01 PROCEDURE — 96413 CHEMO IV INFUSION 1 HR: CPT

## 2019-01-01 PROCEDURE — 96368 THER/DIAG CONCURRENT INF: CPT

## 2019-01-01 PROCEDURE — 83540 ASSAY OF IRON: CPT

## 2019-01-01 PROCEDURE — 700111 HCHG RX REV CODE 636 W/ 250 OVERRIDE (IP)

## 2019-01-01 PROCEDURE — 700111 HCHG RX REV CODE 636 W/ 250 OVERRIDE (IP): Performed by: FAMILY MEDICINE

## 2019-01-01 PROCEDURE — 96415 CHEMO IV INFUSION ADDL HR: CPT

## 2019-01-01 PROCEDURE — 700105 HCHG RX REV CODE 258

## 2019-01-01 PROCEDURE — 36415 COLL VENOUS BLD VENIPUNCTURE: CPT

## 2019-01-01 PROCEDURE — 96417 CHEMO IV INFUS EACH ADDL SEQ: CPT

## 2019-01-01 PROCEDURE — 700102 HCHG RX REV CODE 250 W/ 637 OVERRIDE(OP): Performed by: HOSPITALIST

## 2019-01-01 PROCEDURE — G0498 CHEMO EXTEND IV INFUS W/PUMP: HCPCS

## 2019-01-01 PROCEDURE — 96523 IRRIG DRUG DELIVERY DEVICE: CPT

## 2019-01-01 PROCEDURE — A4212 NON CORING NEEDLE OR STYLET: HCPCS

## 2019-01-01 PROCEDURE — 85007 BL SMEAR W/DIFF WBC COUNT: CPT

## 2019-01-01 PROCEDURE — 83550 IRON BINDING TEST: CPT

## 2019-01-01 PROCEDURE — 82746 ASSAY OF FOLIC ACID SERUM: CPT

## 2019-01-01 PROCEDURE — 700101 HCHG RX REV CODE 250: Performed by: FAMILY MEDICINE

## 2019-01-01 PROCEDURE — 700102 HCHG RX REV CODE 250 W/ 637 OVERRIDE(OP): Performed by: FAMILY MEDICINE

## 2019-01-01 PROCEDURE — 85027 COMPLETE CBC AUTOMATED: CPT

## 2019-01-01 PROCEDURE — 93010 ELECTROCARDIOGRAM REPORT: CPT | Performed by: INTERNAL MEDICINE

## 2019-01-01 PROCEDURE — 770006 HCHG ROOM/CARE - MED/SURG/GYN SEMI*

## 2019-01-01 PROCEDURE — 96367 TX/PROPH/DG ADDL SEQ IV INF: CPT

## 2019-01-01 PROCEDURE — A9270 NON-COVERED ITEM OR SERVICE: HCPCS | Performed by: EMERGENCY MEDICINE

## 2019-01-01 PROCEDURE — 96375 TX/PRO/DX INJ NEW DRUG ADDON: CPT

## 2019-01-01 PROCEDURE — 96372 THER/PROPH/DIAG INJ SC/IM: CPT

## 2019-01-01 PROCEDURE — 87040 BLOOD CULTURE FOR BACTERIA: CPT | Mod: 91

## 2019-01-01 PROCEDURE — 700102 HCHG RX REV CODE 250 W/ 637 OVERRIDE(OP): Performed by: EMERGENCY MEDICINE

## 2019-01-01 PROCEDURE — 36591 DRAW BLOOD OFF VENOUS DEVICE: CPT

## 2019-01-01 PROCEDURE — 83690 ASSAY OF LIPASE: CPT

## 2019-01-01 PROCEDURE — 96366 THER/PROPH/DIAG IV INF ADDON: CPT

## 2019-01-01 PROCEDURE — 700111 HCHG RX REV CODE 636 W/ 250 OVERRIDE (IP): Mod: JG

## 2019-01-01 PROCEDURE — A9270 NON-COVERED ITEM OR SERVICE: HCPCS | Performed by: FAMILY MEDICINE

## 2019-01-01 PROCEDURE — 99232 SBSQ HOSP IP/OBS MODERATE 35: CPT | Performed by: HOSPITALIST

## 2019-01-01 PROCEDURE — 96374 THER/PROPH/DIAG INJ IV PUSH: CPT

## 2019-01-01 PROCEDURE — 96376 TX/PRO/DX INJ SAME DRUG ADON: CPT

## 2019-01-01 PROCEDURE — 700105 HCHG RX REV CODE 258: Performed by: EMERGENCY MEDICINE

## 2019-01-01 PROCEDURE — 82570 ASSAY OF URINE CREATININE: CPT

## 2019-01-01 PROCEDURE — 82010 KETONE BODYS QUAN: CPT

## 2019-01-01 PROCEDURE — 99220 PR INITIAL OBSERVATION CARE,LEVL III: CPT | Performed by: HOSPITALIST

## 2019-01-01 PROCEDURE — 700111 HCHG RX REV CODE 636 W/ 250 OVERRIDE (IP): Mod: JW,JG

## 2019-01-01 PROCEDURE — 82607 VITAMIN B-12: CPT

## 2019-01-01 PROCEDURE — 700111 HCHG RX REV CODE 636 W/ 250 OVERRIDE (IP): Performed by: EMERGENCY MEDICINE

## 2019-01-01 PROCEDURE — 700101 HCHG RX REV CODE 250: Performed by: EMERGENCY MEDICINE

## 2019-01-01 PROCEDURE — 82043 UR ALBUMIN QUANTITATIVE: CPT

## 2019-01-01 PROCEDURE — 96365 THER/PROPH/DIAG IV INF INIT: CPT

## 2019-01-01 PROCEDURE — 84132 ASSAY OF SERUM POTASSIUM: CPT

## 2019-01-01 PROCEDURE — 304561 HCHG STAT O2

## 2019-01-01 PROCEDURE — 306588 SLEEVE,VASO CALF MED: Performed by: HOSPITALIST

## 2019-01-01 PROCEDURE — 99233 SBSQ HOSP IP/OBS HIGH 50: CPT | Performed by: HOSPITALIST

## 2019-01-01 RX ORDER — SODIUM CHLORIDE AND POTASSIUM CHLORIDE 150; 900 MG/100ML; MG/100ML
INJECTION, SOLUTION INTRAVENOUS CONTINUOUS
Status: DISCONTINUED | OUTPATIENT
Start: 2019-01-01 | End: 2019-01-01 | Stop reason: HOSPADM

## 2019-01-01 RX ORDER — 0.9 % SODIUM CHLORIDE 0.9 %
VIAL (ML) INJECTION PRN
Status: CANCELLED | OUTPATIENT
Start: 2019-10-07

## 2019-01-01 RX ORDER — PROCHLORPERAZINE MALEATE 5 MG/1
5 TABLET ORAL EVERY 6 HOURS PRN
Status: ON HOLD | COMMUNITY
End: 2019-01-01 | Stop reason: SDUPTHER

## 2019-01-01 RX ORDER — 0.9 % SODIUM CHLORIDE 0.9 %
5 VIAL (ML) INJECTION PRN
Status: CANCELLED | OUTPATIENT
Start: 2019-01-01

## 2019-01-01 RX ORDER — 0.9 % SODIUM CHLORIDE 0.9 %
5 VIAL (ML) INJECTION PRN
Status: ACTIVE | OUTPATIENT
Start: 2019-01-01

## 2019-01-01 RX ORDER — 0.9 % SODIUM CHLORIDE 0.9 %
VIAL (ML) INJECTION PRN
Status: CANCELLED | OUTPATIENT
Start: 2019-01-01

## 2019-01-01 RX ORDER — SODIUM CHLORIDE 9 MG/ML
INJECTION, SOLUTION INTRAVENOUS CONTINUOUS
Status: CANCELLED | OUTPATIENT
Start: 2019-01-01

## 2019-01-01 RX ORDER — ONDANSETRON 8 MG/1
8 TABLET, ORALLY DISINTEGRATING ORAL PRN
Status: CANCELLED | OUTPATIENT
Start: 2019-10-07

## 2019-01-01 RX ORDER — LIDOCAINE HYDROCHLORIDE 10 MG/ML
INJECTION, SOLUTION EPIDURAL; INFILTRATION; INTRACAUDAL; PERINEURAL
Status: COMPLETED
Start: 2019-01-01 | End: 2019-01-01

## 2019-01-01 RX ORDER — SODIUM CHLORIDE 9 MG/ML
INJECTION, SOLUTION INTRAVENOUS CONTINUOUS
Status: DISCONTINUED | OUTPATIENT
Start: 2019-01-01 | End: 2019-01-01 | Stop reason: HOSPADM

## 2019-01-01 RX ORDER — AMOXICILLIN 250 MG
2 CAPSULE ORAL 2 TIMES DAILY
Status: DISCONTINUED | OUTPATIENT
Start: 2019-01-01 | End: 2019-01-01

## 2019-01-01 RX ORDER — ONDANSETRON 4 MG/1
4 TABLET, ORALLY DISINTEGRATING ORAL EVERY 6 HOURS PRN
Qty: 20 TAB | Refills: 0 | Status: ON HOLD | OUTPATIENT
Start: 2019-01-01 | End: 2019-01-01 | Stop reason: SDUPTHER

## 2019-01-01 RX ORDER — MAGNESIUM OXIDE 400 MG/1
400 TABLET ORAL DAILY
COMMUNITY

## 2019-01-01 RX ORDER — SODIUM CHLORIDE 9 MG/ML
INJECTION, SOLUTION INTRAVENOUS CONTINUOUS
Status: DISCONTINUED | OUTPATIENT
Start: 2019-01-01 | End: 2019-01-01

## 2019-01-01 RX ORDER — SODIUM CHLORIDE 9 MG/ML
1000 INJECTION, SOLUTION INTRAVENOUS ONCE
Status: COMPLETED | OUTPATIENT
Start: 2019-01-01 | End: 2019-01-01

## 2019-01-01 RX ORDER — LOPERAMIDE HYDROCHLORIDE 2 MG/1
2 CAPSULE ORAL
Status: CANCELLED | OUTPATIENT
Start: 2019-01-01

## 2019-01-01 RX ORDER — ONDANSETRON 8 MG/1
8 TABLET, ORALLY DISINTEGRATING ORAL PRN
Status: CANCELLED | OUTPATIENT
Start: 2019-01-01

## 2019-01-01 RX ORDER — ONDANSETRON 8 MG/1
8 TABLET, ORALLY DISINTEGRATING ORAL EVERY 6 HOURS PRN
COMMUNITY

## 2019-01-01 RX ORDER — 0.9 % SODIUM CHLORIDE 0.9 %
VIAL (ML) INJECTION PRN
Status: CANCELLED | OUTPATIENT
Start: 2019-10-06

## 2019-01-01 RX ORDER — 0.9 % SODIUM CHLORIDE 0.9 %
20 VIAL (ML) INJECTION PRN
Status: CANCELLED | OUTPATIENT
Start: 2019-10-09

## 2019-01-01 RX ORDER — HYDROCODONE BITARTRATE AND ACETAMINOPHEN 7.5; 325 MG/1; MG/1
1-2 TABLET ORAL EVERY 8 HOURS PRN
COMMUNITY

## 2019-01-01 RX ORDER — PROCHLORPERAZINE MALEATE 5 MG/1
5 TABLET ORAL EVERY 6 HOURS PRN
COMMUNITY

## 2019-01-01 RX ORDER — LOPERAMIDE HYDROCHLORIDE 2 MG/1
4 CAPSULE ORAL PRN
Status: CANCELLED | OUTPATIENT
Start: 2019-01-01

## 2019-01-01 RX ORDER — ONDANSETRON 2 MG/ML
4 INJECTION INTRAMUSCULAR; INTRAVENOUS PRN
Status: CANCELLED | OUTPATIENT
Start: 2019-10-07

## 2019-01-01 RX ORDER — LOPERAMIDE HYDROCHLORIDE 2 MG/1
4 CAPSULE ORAL 4 TIMES DAILY PRN
Status: DISCONTINUED | OUTPATIENT
Start: 2019-01-01 | End: 2019-01-01 | Stop reason: HOSPADM

## 2019-01-01 RX ORDER — PROCHLORPERAZINE MALEATE 10 MG
10 TABLET ORAL EVERY 6 HOURS PRN
Status: CANCELLED | OUTPATIENT
Start: 2019-01-01

## 2019-01-01 RX ORDER — DEXTROSE MONOHYDRATE 50 MG/ML
INJECTION, SOLUTION INTRAVENOUS CONTINUOUS
Status: CANCELLED | OUTPATIENT
Start: 2019-01-01

## 2019-01-01 RX ORDER — ONDANSETRON 2 MG/ML
4 INJECTION INTRAMUSCULAR; INTRAVENOUS ONCE
Status: COMPLETED | OUTPATIENT
Start: 2019-01-01 | End: 2019-01-01

## 2019-01-01 RX ORDER — DEXTROSE MONOHYDRATE 50 MG/ML
INJECTION, SOLUTION INTRAVENOUS CONTINUOUS
Status: DISCONTINUED | OUTPATIENT
Start: 2019-01-01 | End: 2019-01-01 | Stop reason: HOSPADM

## 2019-01-01 RX ORDER — MORPHINE SULFATE 4 MG/ML
4 INJECTION, SOLUTION INTRAMUSCULAR; INTRAVENOUS
Status: DISCONTINUED | OUTPATIENT
Start: 2019-01-01 | End: 2019-01-01 | Stop reason: HOSPADM

## 2019-01-01 RX ORDER — OXYCODONE HYDROCHLORIDE 5 MG/1
5 TABLET ORAL
Status: DISCONTINUED | OUTPATIENT
Start: 2019-01-01 | End: 2019-01-01 | Stop reason: HOSPADM

## 2019-01-01 RX ORDER — 0.9 % SODIUM CHLORIDE 0.9 %
5 VIAL (ML) INJECTION PRN
Status: CANCELLED | OUTPATIENT
Start: 2019-10-06

## 2019-01-01 RX ORDER — ONDANSETRON 2 MG/ML
4 INJECTION INTRAMUSCULAR; INTRAVENOUS PRN
Status: CANCELLED | OUTPATIENT
Start: 2019-01-01

## 2019-01-01 RX ORDER — 0.9 % SODIUM CHLORIDE 0.9 %
20 VIAL (ML) INJECTION PRN
Status: CANCELLED | OUTPATIENT
Start: 2019-01-01

## 2019-01-01 RX ORDER — DEXTROSE MONOHYDRATE 50 MG/ML
INJECTION, SOLUTION INTRAVENOUS CONTINUOUS
Status: CANCELLED | OUTPATIENT
Start: 2019-10-07

## 2019-01-01 RX ORDER — LOPERAMIDE HYDROCHLORIDE 2 MG/1
2 CAPSULE ORAL
Status: CANCELLED | OUTPATIENT
Start: 2019-10-07

## 2019-01-01 RX ORDER — POLYETHYLENE GLYCOL 3350 17 G/17G
1 POWDER, FOR SOLUTION ORAL
Status: DISCONTINUED | OUTPATIENT
Start: 2019-01-01 | End: 2019-01-01

## 2019-01-01 RX ORDER — LIDOCAINE HYDROCHLORIDE 20 MG/ML
JELLY TOPICAL ONCE
Status: COMPLETED | OUTPATIENT
Start: 2019-01-01 | End: 2019-01-01

## 2019-01-01 RX ORDER — SODIUM CHLORIDE 9 MG/ML
1000 INJECTION, SOLUTION INTRAVENOUS ONCE
Status: CANCELLED | OUTPATIENT
Start: 2019-01-01

## 2019-01-01 RX ORDER — MAGNESIUM SULFATE HEPTAHYDRATE 40 MG/ML
2 INJECTION, SOLUTION INTRAVENOUS ONCE
Status: COMPLETED | OUTPATIENT
Start: 2019-01-01 | End: 2019-01-01

## 2019-01-01 RX ORDER — PROCHLORPERAZINE EDISYLATE 5 MG/ML
10 INJECTION INTRAMUSCULAR; INTRAVENOUS ONCE
Status: COMPLETED | OUTPATIENT
Start: 2019-01-01 | End: 2019-01-01

## 2019-01-01 RX ORDER — MORPHINE SULFATE 4 MG/ML
2 INJECTION, SOLUTION INTRAMUSCULAR; INTRAVENOUS ONCE
Status: DISCONTINUED | OUTPATIENT
Start: 2019-01-01 | End: 2019-01-01 | Stop reason: HOSPADM

## 2019-01-01 RX ORDER — METOCLOPRAMIDE HYDROCHLORIDE 5 MG/ML
10 INJECTION INTRAMUSCULAR; INTRAVENOUS EVERY 6 HOURS PRN
Status: DISCONTINUED | OUTPATIENT
Start: 2019-01-01 | End: 2019-01-01 | Stop reason: HOSPADM

## 2019-01-01 RX ORDER — LOPERAMIDE HYDROCHLORIDE 2 MG/1
4 CAPSULE ORAL PRN
Status: CANCELLED | OUTPATIENT
Start: 2019-10-07

## 2019-01-01 RX ORDER — HEPARIN SODIUM 5000 [USP'U]/ML
5000 INJECTION, SOLUTION INTRAVENOUS; SUBCUTANEOUS EVERY 8 HOURS
Status: DISCONTINUED | OUTPATIENT
Start: 2019-01-01 | End: 2019-01-01

## 2019-01-01 RX ORDER — MIRTAZAPINE 30 MG/1
30 TABLET, FILM COATED ORAL NIGHTLY
COMMUNITY

## 2019-01-01 RX ORDER — POTASSIUM CHLORIDE 20 MEQ/1
40 TABLET, EXTENDED RELEASE ORAL ONCE
Status: COMPLETED | OUTPATIENT
Start: 2019-01-01 | End: 2019-01-01

## 2019-01-01 RX ORDER — OXYCODONE HYDROCHLORIDE 10 MG/1
10 TABLET ORAL
Status: DISCONTINUED | OUTPATIENT
Start: 2019-01-01 | End: 2019-01-01 | Stop reason: HOSPADM

## 2019-01-01 RX ORDER — LIDOCAINE AND PRILOCAINE 25; 25 MG/G; MG/G
CREAM TOPICAL ONCE
Status: COMPLETED | OUTPATIENT
Start: 2019-01-01 | End: 2019-01-01

## 2019-01-01 RX ORDER — ERGOCALCIFEROL 1.25 MG/1
50000 CAPSULE ORAL
COMMUNITY

## 2019-01-01 RX ORDER — LOPERAMIDE HYDROCHLORIDE 2 MG/1
2 CAPSULE ORAL 4 TIMES DAILY PRN
COMMUNITY

## 2019-01-01 RX ORDER — HYDROCODONE BITARTRATE AND ACETAMINOPHEN 10; 325 MG/1; MG/1
1 TABLET ORAL ONCE
Status: COMPLETED | OUTPATIENT
Start: 2019-01-01 | End: 2019-01-01

## 2019-01-01 RX ORDER — PROCHLORPERAZINE MALEATE 5 MG/1
5 TABLET ORAL EVERY 6 HOURS PRN
Qty: 30 TAB | Refills: 0 | Status: SHIPPED | OUTPATIENT
Start: 2019-01-01 | End: 2019-01-01

## 2019-01-01 RX ORDER — 0.9 % SODIUM CHLORIDE 0.9 %
VIAL (ML) INJECTION PRN
Status: ACTIVE | OUTPATIENT
Start: 2019-01-01

## 2019-01-01 RX ORDER — SODIUM CHLORIDE, SODIUM LACTATE, POTASSIUM CHLORIDE, AND CALCIUM CHLORIDE .6; .31; .03; .02 G/100ML; G/100ML; G/100ML; G/100ML
30 INJECTION, SOLUTION INTRAVENOUS ONCE
Status: COMPLETED | OUTPATIENT
Start: 2019-01-01 | End: 2019-01-01

## 2019-01-01 RX ORDER — MIRTAZAPINE 30 MG/1
30 TABLET, FILM COATED ORAL NIGHTLY
Status: DISCONTINUED | OUTPATIENT
Start: 2019-01-01 | End: 2019-01-01 | Stop reason: HOSPADM

## 2019-01-01 RX ORDER — POLYETHYLENE GLYCOL 3350 17 G/17G
17 POWDER, FOR SOLUTION ORAL
Status: ON HOLD | COMMUNITY
End: 2019-01-01

## 2019-01-01 RX ORDER — ONDANSETRON 4 MG/1
4 TABLET, ORALLY DISINTEGRATING ORAL EVERY 6 HOURS PRN
Qty: 20 TAB | Refills: 0 | Status: SHIPPED | OUTPATIENT
Start: 2019-01-01 | End: 2019-01-01

## 2019-01-01 RX ORDER — MORPHINE SULFATE 4 MG/ML
4 INJECTION, SOLUTION INTRAMUSCULAR; INTRAVENOUS ONCE
Status: COMPLETED | OUTPATIENT
Start: 2019-01-01 | End: 2019-01-01

## 2019-01-01 RX ORDER — PROCHLORPERAZINE 25 MG
25 SUPPOSITORY, RECTAL RECTAL EVERY 6 HOURS PRN
Qty: 10 SUPPOSITORY | Refills: 1 | Status: SHIPPED | OUTPATIENT
Start: 2019-01-01 | End: 2019-01-01

## 2019-01-01 RX ORDER — POTASSIUM CHLORIDE 7.45 MG/ML
10 INJECTION INTRAVENOUS
Status: COMPLETED | OUTPATIENT
Start: 2019-01-01 | End: 2019-01-01

## 2019-01-01 RX ORDER — LORAZEPAM 2 MG/ML
0.5 INJECTION INTRAMUSCULAR ONCE
Status: DISCONTINUED | OUTPATIENT
Start: 2019-01-01 | End: 2019-01-01 | Stop reason: HOSPADM

## 2019-01-01 RX ORDER — 0.9 % SODIUM CHLORIDE 0.9 %
5 VIAL (ML) INJECTION PRN
Status: CANCELLED | OUTPATIENT
Start: 2019-10-07

## 2019-01-01 RX ORDER — ONDANSETRON 2 MG/ML
4 INJECTION INTRAMUSCULAR; INTRAVENOUS EVERY 4 HOURS PRN
Status: DISCONTINUED | OUTPATIENT
Start: 2019-01-01 | End: 2019-01-01 | Stop reason: HOSPADM

## 2019-01-01 RX ORDER — LOPERAMIDE HYDROCHLORIDE 2 MG/1
4 CAPSULE ORAL 4 TIMES DAILY PRN
Qty: 40 CAP | Refills: 0 | Status: SHIPPED | OUTPATIENT
Start: 2019-01-01 | End: 2019-01-01

## 2019-01-01 RX ORDER — PROCHLORPERAZINE 25 MG
25 SUPPOSITORY, RECTAL RECTAL EVERY 6 HOURS PRN
Qty: 10 SUPPOSITORY | Refills: 0 | Status: ON HOLD | OUTPATIENT
Start: 2019-01-01 | End: 2019-01-01 | Stop reason: SDUPTHER

## 2019-01-01 RX ORDER — SODIUM CHLORIDE 9 MG/ML
2000 INJECTION, SOLUTION INTRAVENOUS ONCE
Status: COMPLETED | OUTPATIENT
Start: 2019-01-01 | End: 2019-01-01

## 2019-01-01 RX ORDER — BISACODYL 10 MG
10 SUPPOSITORY, RECTAL RECTAL
Status: DISCONTINUED | OUTPATIENT
Start: 2019-01-01 | End: 2019-01-01

## 2019-01-01 RX ORDER — LORAZEPAM 2 MG/ML
0.5 INJECTION INTRAMUSCULAR ONCE
Status: COMPLETED | OUTPATIENT
Start: 2019-01-01 | End: 2019-01-01

## 2019-01-01 RX ORDER — PROCHLORPERAZINE MALEATE 10 MG
10 TABLET ORAL EVERY 6 HOURS PRN
Status: CANCELLED | OUTPATIENT
Start: 2019-10-07

## 2019-01-01 RX ORDER — ONDANSETRON 4 MG/1
4 TABLET, ORALLY DISINTEGRATING ORAL EVERY 4 HOURS PRN
Status: DISCONTINUED | OUTPATIENT
Start: 2019-01-01 | End: 2019-01-01 | Stop reason: HOSPADM

## 2019-01-01 RX ORDER — SODIUM CHLORIDE 9 MG/ML
INJECTION, SOLUTION INTRAVENOUS CONTINUOUS
Status: CANCELLED | OUTPATIENT
Start: 2019-10-07

## 2019-01-01 RX ORDER — MAGNESIUM SULFATE 1 G/100ML
1 INJECTION INTRAVENOUS ONCE
Status: COMPLETED | OUTPATIENT
Start: 2019-01-01 | End: 2019-01-01

## 2019-01-01 RX ADMIN — SODIUM CHLORIDE 256 MG: 9 INJECTION, SOLUTION INTRAVENOUS at 15:40

## 2019-01-01 RX ADMIN — SODIUM CHLORIDE: 9 INJECTION, SOLUTION INTRAVENOUS at 17:11

## 2019-01-01 RX ADMIN — LEUCOVORIN CALCIUM 592 MG: 350 INJECTION, POWDER, LYOPHILIZED, FOR SUSPENSION INTRAMUSCULAR; INTRAVENOUS at 13:16

## 2019-01-01 RX ADMIN — ONDANSETRON 4 MG: 4 TABLET, ORALLY DISINTEGRATING ORAL at 22:19

## 2019-01-01 RX ADMIN — POTASSIUM CHLORIDE AND SODIUM CHLORIDE: 900; 150 INJECTION, SOLUTION INTRAVENOUS at 16:26

## 2019-01-01 RX ADMIN — LIDOCAINE HYDROCHLORIDE 5 ML: 10 INJECTION, SOLUTION EPIDURAL; INFILTRATION; INTRACAUDAL at 15:00

## 2019-01-01 RX ADMIN — MORPHINE SULFATE 4 MG: 4 INJECTION INTRAVENOUS at 17:40

## 2019-01-01 RX ADMIN — ONDANSETRON 4 MG: 2 INJECTION INTRAMUSCULAR; INTRAVENOUS at 15:03

## 2019-01-01 RX ADMIN — MIRTAZAPINE 30 MG: 30 TABLET, FILM COATED ORAL at 22:41

## 2019-01-01 RX ADMIN — LIDOCAINE AND PRILOCAINE 1 APPLICATION: 25; 25 CREAM TOPICAL at 11:29

## 2019-01-01 RX ADMIN — MAGNESIUM GLUCONATE 500 MG ORAL TABLET 400 MG: 500 TABLET ORAL at 14:41

## 2019-01-01 RX ADMIN — ONDANSETRON HYDROCHLORIDE 16 MG: 2 SOLUTION INTRAMUSCULAR; INTRAVENOUS at 10:05

## 2019-01-01 RX ADMIN — SODIUM CHLORIDE: 9 INJECTION, SOLUTION INTRAVENOUS at 04:30

## 2019-01-01 RX ADMIN — DEXTROSE MONOHYDRATE 257.4 MG: 50 INJECTION, SOLUTION INTRAVENOUS at 15:51

## 2019-01-01 RX ADMIN — SODIUM CHLORIDE 241 MG: 9 INJECTION, SOLUTION INTRAVENOUS at 17:38

## 2019-01-01 RX ADMIN — POTASSIUM CHLORIDE 40 MEQ: 20 TABLET, EXTENDED RELEASE ORAL at 20:58

## 2019-01-01 RX ADMIN — POTASSIUM CHLORIDE 10 MEQ: 10 INJECTION, SOLUTION INTRAVENOUS at 09:04

## 2019-01-01 RX ADMIN — MAGNESIUM GLUCONATE 500 MG ORAL TABLET 400 MG: 500 TABLET ORAL at 05:44

## 2019-01-01 RX ADMIN — MORPHINE SULFATE 4 MG: 4 INJECTION INTRAVENOUS at 12:24

## 2019-01-01 RX ADMIN — Medication 500 UNITS: at 18:05

## 2019-01-01 RX ADMIN — OXYCODONE HYDROCHLORIDE 10 MG: 10 TABLET ORAL at 21:36

## 2019-01-01 RX ADMIN — POTASSIUM CHLORIDE AND SODIUM CHLORIDE: 900; 150 INJECTION, SOLUTION INTRAVENOUS at 21:18

## 2019-01-01 RX ADMIN — FLUOROURACIL 3550 MG: 50 INJECTION, SOLUTION INTRAVENOUS at 16:35

## 2019-01-01 RX ADMIN — POTASSIUM CHLORIDE 10 MEQ: 10 INJECTION, SOLUTION INTRAVENOUS at 07:02

## 2019-01-01 RX ADMIN — SODIUM CHLORIDE 150 MG: 9 INJECTION, SOLUTION INTRAVENOUS at 11:15

## 2019-01-01 RX ADMIN — ONDANSETRON 4 MG: 2 INJECTION INTRAMUSCULAR; INTRAVENOUS at 03:50

## 2019-01-01 RX ADMIN — SODIUM CHLORIDE: 9 INJECTION, SOLUTION INTRAVENOUS at 05:54

## 2019-01-01 RX ADMIN — OXYCODONE HYDROCHLORIDE 5 MG: 5 TABLET ORAL at 04:03

## 2019-01-01 RX ADMIN — OXYCODONE HYDROCHLORIDE 5 MG: 5 TABLET ORAL at 10:39

## 2019-01-01 RX ADMIN — LEUCOVORIN CALCIUM 564 MG: 350 INJECTION, POWDER, LYOPHILIZED, FOR SUSPENSION INTRAMUSCULAR; INTRAVENOUS at 12:07

## 2019-01-01 RX ADMIN — DEXTROSE MONOHYDRATE 261 MG: 50 INJECTION, SOLUTION INTRAVENOUS at 11:10

## 2019-01-01 RX ADMIN — OXYCODONE HYDROCHLORIDE 5 MG: 5 TABLET ORAL at 22:41

## 2019-01-01 RX ADMIN — LORAZEPAM 0.5 MG: 2 INJECTION INTRAMUSCULAR; INTRAVENOUS at 12:29

## 2019-01-01 RX ADMIN — MIRTAZAPINE 30 MG: 30 TABLET, FILM COATED ORAL at 20:34

## 2019-01-01 RX ADMIN — SODIUM CHLORIDE 150 MG: 9 INJECTION, SOLUTION INTRAVENOUS at 12:30

## 2019-01-01 RX ADMIN — ATROPINE SULFATE 0.5 MG: 1 INJECTION, SOLUTION INTRAMUSCULAR; INTRAVENOUS; SUBCUTANEOUS at 12:18

## 2019-01-01 RX ADMIN — POTASSIUM CHLORIDE AND SODIUM CHLORIDE: 900; 150 INJECTION, SOLUTION INTRAVENOUS at 05:36

## 2019-01-01 RX ADMIN — DEXAMETHASONE SODIUM PHOSPHATE 12 MG: 4 INJECTION, SOLUTION INTRAMUSCULAR; INTRAVENOUS at 09:50

## 2019-01-01 RX ADMIN — FLUOROURACIL 3480 MG: 50 INJECTION, SOLUTION INTRAVENOUS at 13:35

## 2019-01-01 RX ADMIN — HEPARIN 500 UNITS: 100 SYRINGE at 15:01

## 2019-01-01 RX ADMIN — FLUOROURACIL 3410 MG: 50 INJECTION, SOLUTION INTRAVENOUS at 15:05

## 2019-01-01 RX ADMIN — SODIUM CHLORIDE: 9 INJECTION, SOLUTION INTRAVENOUS at 03:54

## 2019-01-01 RX ADMIN — METOCLOPRAMIDE 10 MG: 5 INJECTION, SOLUTION INTRAMUSCULAR; INTRAVENOUS at 20:34

## 2019-01-01 RX ADMIN — HEPARIN 500 UNITS: 100 SYRINGE at 13:15

## 2019-01-01 RX ADMIN — DEXAMETHASONE SODIUM PHOSPHATE 12 MG: 4 INJECTION, SOLUTION INTRAMUSCULAR; INTRAVENOUS at 10:55

## 2019-01-01 RX ADMIN — SODIUM CHLORIDE 150 MG: 9 INJECTION, SOLUTION INTRAVENOUS at 14:46

## 2019-01-01 RX ADMIN — LIDOCAINE HYDROCHLORIDE 5 ML: 10 INJECTION, SOLUTION EPIDURAL; INFILTRATION; INTRACAUDAL at 08:24

## 2019-01-01 RX ADMIN — OXYCODONE HYDROCHLORIDE 10 MG: 10 TABLET ORAL at 13:34

## 2019-01-01 RX ADMIN — MAGNESIUM GLUCONATE 500 MG ORAL TABLET 400 MG: 500 TABLET ORAL at 04:03

## 2019-01-01 RX ADMIN — ONDANSETRON 4 MG: 4 TABLET, ORALLY DISINTEGRATING ORAL at 17:16

## 2019-01-01 RX ADMIN — METOCLOPRAMIDE 10 MG: 5 INJECTION, SOLUTION INTRAMUSCULAR; INTRAVENOUS at 18:43

## 2019-01-01 RX ADMIN — LIDOCAINE HYDROCHLORIDE 5 ML: 20 JELLY TOPICAL at 17:39

## 2019-01-01 RX ADMIN — OXYCODONE HYDROCHLORIDE 10 MG: 10 TABLET ORAL at 08:31

## 2019-01-01 RX ADMIN — POTASSIUM CHLORIDE 40 MEQ: 20 TABLET, EXTENDED RELEASE ORAL at 05:21

## 2019-01-01 RX ADMIN — SODIUM CHLORIDE 1000 ML: 9 INJECTION, SOLUTION INTRAVENOUS at 15:06

## 2019-01-01 RX ADMIN — HEPARIN SODIUM 5000 UNITS: 5000 INJECTION INTRAVENOUS; SUBCUTANEOUS at 05:48

## 2019-01-01 RX ADMIN — HYDROCODONE BITARTRATE AND ACETAMINOPHEN 1 TABLET: 10; 325 TABLET ORAL at 13:08

## 2019-01-01 RX ADMIN — FLUOROURACIL 580 MG: 50 INJECTION, SOLUTION INTRAVENOUS at 12:50

## 2019-01-01 RX ADMIN — Medication 500 UNITS: at 14:17

## 2019-01-01 RX ADMIN — SODIUM CHLORIDE: 9 INJECTION, SOLUTION INTRAVENOUS at 21:46

## 2019-01-01 RX ADMIN — LEUCOVORIN CALCIUM 568 MG: 350 INJECTION, POWDER, LYOPHILIZED, FOR SUSPENSION INTRAMUSCULAR; INTRAVENOUS at 12:25

## 2019-01-01 RX ADMIN — ONDANSETRON 4 MG: 2 INJECTION INTRAMUSCULAR; INTRAVENOUS at 19:27

## 2019-01-01 RX ADMIN — OXYCODONE HYDROCHLORIDE 10 MG: 10 TABLET ORAL at 16:27

## 2019-01-01 RX ADMIN — ATROPINE SULFATE 0.5 MG: 1 INJECTION, SOLUTION INTRAMUSCULAR; INTRAVENOUS; SUBCUTANEOUS at 11:05

## 2019-01-01 RX ADMIN — DEXTROSE MONOHYDRATE 253.8 MG: 50 INJECTION, SOLUTION INTRAVENOUS at 12:06

## 2019-01-01 RX ADMIN — ONDANSETRON 4 MG: 2 INJECTION INTRAMUSCULAR; INTRAVENOUS at 14:29

## 2019-01-01 RX ADMIN — OXYCODONE HYDROCHLORIDE 10 MG: 10 TABLET ORAL at 10:05

## 2019-01-01 RX ADMIN — OXYCODONE HYDROCHLORIDE 10 MG: 10 TABLET ORAL at 18:42

## 2019-01-01 RX ADMIN — HEPARIN 500 UNITS: 100 SYRINGE at 17:18

## 2019-01-01 RX ADMIN — SODIUM CHLORIDE 2000 ML: 9 INJECTION, SOLUTION INTRAVENOUS at 17:39

## 2019-01-01 RX ADMIN — SODIUM CHLORIDE: 9 INJECTION, SOLUTION INTRAVENOUS at 20:58

## 2019-01-01 RX ADMIN — ONDANSETRON 4 MG: 2 INJECTION INTRAMUSCULAR; INTRAVENOUS at 05:45

## 2019-01-01 RX ADMIN — OXYCODONE HYDROCHLORIDE 10 MG: 10 TABLET ORAL at 03:50

## 2019-01-01 RX ADMIN — MIRTAZAPINE 30 MG: 30 TABLET, FILM COATED ORAL at 22:14

## 2019-01-01 RX ADMIN — MIRTAZAPINE 30 MG: 30 TABLET, FILM COATED ORAL at 21:36

## 2019-01-01 RX ADMIN — MAGNESIUM SULFATE 2 G: 2 INJECTION INTRAVENOUS at 09:38

## 2019-01-01 RX ADMIN — MIRTAZAPINE 30 MG: 30 TABLET, FILM COATED ORAL at 21:58

## 2019-01-01 RX ADMIN — SODIUM CHLORIDE: 9 INJECTION, SOLUTION INTRAVENOUS at 01:59

## 2019-01-01 RX ADMIN — LIDOCAINE HYDROCHLORIDE 5 ML: 10 INJECTION, SOLUTION EPIDURAL; INFILTRATION; INTRACAUDAL at 14:11

## 2019-01-01 RX ADMIN — OXYCODONE HYDROCHLORIDE 10 MG: 10 TABLET ORAL at 02:26

## 2019-01-01 RX ADMIN — ATROPINE SULFATE 0.5 MG: 1 INJECTION, SOLUTION INTRAMUSCULAR; INTRAVENOUS; SUBCUTANEOUS at 13:16

## 2019-01-01 RX ADMIN — LIDOCAINE HYDROCHLORIDE 5 ML: 10 INJECTION, SOLUTION EPIDURAL; INFILTRATION; INTRACAUDAL at 11:01

## 2019-01-01 RX ADMIN — FLUOROURACIL 565 MG: 50 INJECTION, SOLUTION INTRAVENOUS at 15:28

## 2019-01-01 RX ADMIN — OXYCODONE HYDROCHLORIDE 10 MG: 10 TABLET ORAL at 10:01

## 2019-01-01 RX ADMIN — LOPERAMIDE HYDROCHLORIDE 4 MG: 2 CAPSULE ORAL at 11:20

## 2019-01-01 RX ADMIN — FLUOROURACIL 590 MG: 50 INJECTION, SOLUTION INTRAVENOUS at 16:27

## 2019-01-01 RX ADMIN — DEXAMETHASONE SODIUM PHOSPHATE 12 MG: 4 INJECTION, SOLUTION INTRAMUSCULAR; INTRAVENOUS at 14:00

## 2019-01-01 RX ADMIN — SODIUM CHLORIDE, POTASSIUM CHLORIDE, SODIUM LACTATE AND CALCIUM CHLORIDE 1365 ML: 600; 310; 30; 20 INJECTION, SOLUTION INTRAVENOUS at 11:57

## 2019-01-01 RX ADMIN — MAGNESIUM SULFATE 2 G: 2 INJECTION INTRAVENOUS at 05:57

## 2019-01-01 RX ADMIN — ONDANSETRON 4 MG: 2 INJECTION INTRAMUSCULAR; INTRAVENOUS at 17:50

## 2019-01-01 RX ADMIN — OXYCODONE HYDROCHLORIDE 10 MG: 10 TABLET ORAL at 16:36

## 2019-01-01 RX ADMIN — LIDOCAINE HYDROCHLORIDE 5 ML: 10 INJECTION, SOLUTION EPIDURAL; INFILTRATION; INTRACAUDAL at 13:10

## 2019-01-01 RX ADMIN — POTASSIUM CHLORIDE 10 MEQ: 10 INJECTION, SOLUTION INTRAVENOUS at 08:03

## 2019-01-01 RX ADMIN — MIRTAZAPINE 30 MG: 30 TABLET, FILM COATED ORAL at 21:05

## 2019-01-01 RX ADMIN — OXYCODONE HYDROCHLORIDE 10 MG: 10 TABLET ORAL at 20:34

## 2019-01-01 RX ADMIN — SODIUM CHLORIDE 246 MG: 9 INJECTION, SOLUTION INTRAVENOUS at 13:00

## 2019-01-01 RX ADMIN — ONDANSETRON 4 MG: 2 INJECTION INTRAMUSCULAR; INTRAVENOUS at 20:50

## 2019-01-01 RX ADMIN — SODIUM CHLORIDE: 9 INJECTION, SOLUTION INTRAVENOUS at 18:41

## 2019-01-01 RX ADMIN — SODIUM CHLORIDE: 9 INJECTION, SOLUTION INTRAVENOUS at 00:10

## 2019-01-01 RX ADMIN — FLUOROURACIL 3385 MG: 50 INJECTION, SOLUTION INTRAVENOUS at 15:42

## 2019-01-01 RX ADMIN — OXYCODONE HYDROCHLORIDE 5 MG: 5 TABLET ORAL at 17:55

## 2019-01-01 RX ADMIN — SODIUM CHLORIDE: 9 INJECTION, SOLUTION INTRAVENOUS at 11:47

## 2019-01-01 RX ADMIN — DEXAMETHASONE SODIUM PHOSPHATE 12 MG: 4 INJECTION, SOLUTION INTRAMUSCULAR; INTRAVENOUS at 15:10

## 2019-01-01 RX ADMIN — POTASSIUM CHLORIDE 40 MEQ: 1500 TABLET, EXTENDED RELEASE ORAL at 14:41

## 2019-01-01 RX ADMIN — ATROPINE SULFATE 0.5 MG: 1 INJECTION, SOLUTION INTRAMUSCULAR; INTRAVENOUS; SUBCUTANEOUS at 12:03

## 2019-01-01 RX ADMIN — POTASSIUM CHLORIDE AND SODIUM CHLORIDE: 900; 150 INJECTION, SOLUTION INTRAVENOUS at 10:01

## 2019-01-01 RX ADMIN — SODIUM CHLORIDE: 9 INJECTION, SOLUTION INTRAVENOUS at 10:08

## 2019-01-01 RX ADMIN — POTASSIUM CHLORIDE AND SODIUM CHLORIDE: 900; 150 INJECTION, SOLUTION INTRAVENOUS at 02:55

## 2019-01-01 RX ADMIN — FLUOROURACIL 570 MG: 50 INJECTION, SOLUTION INTRAVENOUS at 14:56

## 2019-01-01 RX ADMIN — HEPARIN 500 UNITS: 100 SYRINGE at 14:09

## 2019-01-01 RX ADMIN — OXYCODONE HYDROCHLORIDE 5 MG: 5 TABLET ORAL at 09:37

## 2019-01-01 RX ADMIN — OXYCODONE HYDROCHLORIDE 10 MG: 10 TABLET ORAL at 12:51

## 2019-01-01 RX ADMIN — DEXAMETHASONE SODIUM PHOSPHATE 12 MG: 4 INJECTION, SOLUTION INTRAMUSCULAR; INTRAVENOUS at 10:09

## 2019-01-01 RX ADMIN — MORPHINE SULFATE 4 MG: 4 INJECTION INTRAVENOUS at 21:59

## 2019-01-01 RX ADMIN — DEXTROSE MONOHYDRATE: 50 INJECTION, SOLUTION INTRAVENOUS at 15:45

## 2019-01-01 RX ADMIN — BEVACIZUMAB 235 MG: 400 INJECTION, SOLUTION INTRAVENOUS at 14:19

## 2019-01-01 RX ADMIN — SODIUM CHLORIDE: 9 INJECTION, SOLUTION INTRAVENOUS at 14:01

## 2019-01-01 RX ADMIN — ONDANSETRON HYDROCHLORIDE 16 MG: 2 SOLUTION INTRAMUSCULAR; INTRAVENOUS at 12:11

## 2019-01-01 RX ADMIN — ONDANSETRON 4 MG: 4 TABLET, ORALLY DISINTEGRATING ORAL at 03:11

## 2019-01-01 RX ADMIN — SODIUM CHLORIDE: 9 INJECTION, SOLUTION INTRAVENOUS at 14:42

## 2019-01-01 RX ADMIN — LEUCOVORIN CALCIUM 572 MG: 350 INJECTION, POWDER, LYOPHILIZED, FOR SUSPENSION INTRAMUSCULAR; INTRAVENOUS at 15:49

## 2019-01-01 RX ADMIN — MAGNESIUM GLUCONATE 500 MG ORAL TABLET 400 MG: 500 TABLET ORAL at 04:30

## 2019-01-01 RX ADMIN — HEPARIN 500 UNITS: 100 SYRINGE at 13:50

## 2019-01-01 RX ADMIN — SODIUM CHLORIDE 150 MG: 9 INJECTION, SOLUTION INTRAVENOUS at 10:54

## 2019-01-01 RX ADMIN — POTASSIUM CHLORIDE 10 MEQ: 7.46 INJECTION, SOLUTION INTRAVENOUS at 16:54

## 2019-01-01 RX ADMIN — ONDANSETRON 4 MG: 2 INJECTION INTRAMUSCULAR; INTRAVENOUS at 17:40

## 2019-01-01 RX ADMIN — DEXAMETHASONE SODIUM PHOSPHATE 12 MG: 4 INJECTION, SOLUTION INTRAMUSCULAR; INTRAVENOUS at 11:44

## 2019-01-01 RX ADMIN — METOCLOPRAMIDE 10 MG: 5 INJECTION, SOLUTION INTRAMUSCULAR; INTRAVENOUS at 08:52

## 2019-01-01 RX ADMIN — MIRTAZAPINE 30 MG: 30 TABLET, FILM COATED ORAL at 20:46

## 2019-01-01 RX ADMIN — MAGNESIUM SULFATE HEPTAHYDRATE 1 G: 1 INJECTION, SOLUTION INTRAVENOUS at 14:28

## 2019-01-01 RX ADMIN — POTASSIUM CHLORIDE AND SODIUM CHLORIDE 1000 ML: 900; 150 INJECTION, SOLUTION INTRAVENOUS at 11:21

## 2019-01-01 RX ADMIN — HEPARIN SODIUM 5000 UNITS: 5000 INJECTION INTRAVENOUS; SUBCUTANEOUS at 21:58

## 2019-01-01 RX ADMIN — SODIUM CHLORIDE 150 MG: 9 INJECTION, SOLUTION INTRAVENOUS at 10:30

## 2019-01-01 RX ADMIN — ONDANSETRON 4 MG: 4 TABLET, ORALLY DISINTEGRATING ORAL at 20:13

## 2019-01-01 RX ADMIN — PROCHLORPERAZINE EDISYLATE 10 MG: 5 INJECTION INTRAMUSCULAR; INTRAVENOUS at 20:23

## 2019-01-01 RX ADMIN — HEPARIN 500 UNITS: 100 SYRINGE at 17:26

## 2019-01-01 RX ADMIN — ONDANSETRON HYDROCHLORIDE 16 MG: 2 SOLUTION INTRAMUSCULAR; INTRAVENOUS at 14:16

## 2019-01-01 RX ADMIN — OXYCODONE HYDROCHLORIDE 5 MG: 5 TABLET ORAL at 17:16

## 2019-01-01 RX ADMIN — METOCLOPRAMIDE 10 MG: 5 INJECTION, SOLUTION INTRAMUSCULAR; INTRAVENOUS at 11:47

## 2019-01-01 RX ADMIN — VANCOMYCIN HYDROCHLORIDE 1200 MG: 500 INJECTION, POWDER, LYOPHILIZED, FOR SOLUTION INTRAVENOUS at 14:04

## 2019-01-01 RX ADMIN — DEXTROSE MONOHYDRATE: 50 INJECTION, SOLUTION INTRAVENOUS at 12:02

## 2019-01-01 RX ADMIN — ONDANSETRON HYDROCHLORIDE 16 MG: 2 SOLUTION INTRAMUSCULAR; INTRAVENOUS at 10:35

## 2019-01-01 RX ADMIN — METOCLOPRAMIDE 10 MG: 5 INJECTION, SOLUTION INTRAMUSCULAR; INTRAVENOUS at 05:37

## 2019-01-01 RX ADMIN — OXYCODONE HYDROCHLORIDE 10 MG: 10 TABLET ORAL at 01:57

## 2019-01-01 RX ADMIN — FLUOROURACIL 570 MG: 50 INJECTION, SOLUTION INTRAVENOUS at 18:40

## 2019-01-01 RX ADMIN — ONDANSETRON HYDROCHLORIDE 16 MG: 2 SOLUTION INTRAMUSCULAR; INTRAVENOUS at 10:31

## 2019-01-01 RX ADMIN — POTASSIUM CHLORIDE AND SODIUM CHLORIDE: 900; 150 INJECTION, SOLUTION INTRAVENOUS at 00:20

## 2019-01-01 RX ADMIN — POTASSIUM CHLORIDE 10 MEQ: 10 INJECTION, SOLUTION INTRAVENOUS at 05:44

## 2019-01-01 RX ADMIN — OXYCODONE HYDROCHLORIDE 10 MG: 10 TABLET ORAL at 13:48

## 2019-01-01 RX ADMIN — CEFEPIME 2 G: 2 INJECTION, POWDER, FOR SOLUTION INTRAVENOUS at 13:08

## 2019-01-01 RX ADMIN — POTASSIUM CHLORIDE 10 MEQ: 7.46 INJECTION, SOLUTION INTRAVENOUS at 15:51

## 2019-01-01 RX ADMIN — POTASSIUM CHLORIDE AND SODIUM CHLORIDE: 900; 150 INJECTION, SOLUTION INTRAVENOUS at 18:43

## 2019-01-01 RX ADMIN — OXYCODONE HYDROCHLORIDE 5 MG: 5 TABLET ORAL at 04:30

## 2019-01-01 RX ADMIN — HEPARIN 500 UNITS: 100 SYRINGE at 16:10

## 2019-01-01 RX ADMIN — OXYCODONE HYDROCHLORIDE 10 MG: 10 TABLET ORAL at 08:52

## 2019-01-01 RX ADMIN — DEXTROSE MONOHYDRATE 255.6 MG: 50 INJECTION, SOLUTION INTRAVENOUS at 12:26

## 2019-01-01 RX ADMIN — OXYCODONE HYDROCHLORIDE 10 MG: 10 TABLET ORAL at 14:30

## 2019-01-01 RX ADMIN — OXYCODONE HYDROCHLORIDE 10 MG: 10 TABLET ORAL at 22:14

## 2019-01-01 RX ADMIN — FLUOROURACIL 3430 MG: 50 INJECTION, SOLUTION INTRAVENOUS at 18:47

## 2019-01-01 RX ADMIN — ATROPINE SULFATE 0.5 MG: 1 INJECTION, SOLUTION INTRAMUSCULAR; INTRAVENOUS; SUBCUTANEOUS at 15:50

## 2019-01-01 RX ADMIN — LEUCOVORIN CALCIUM 580 MG: 350 INJECTION, POWDER, LYOPHILIZED, FOR SUSPENSION INTRAMUSCULAR; INTRAVENOUS at 11:10

## 2019-01-01 RX ADMIN — ONDANSETRON 4 MG: 4 TABLET, ORALLY DISINTEGRATING ORAL at 16:26

## 2019-01-01 RX ADMIN — DEXTROSE MONOHYDRATE 266.4 MG: 50 INJECTION, SOLUTION INTRAVENOUS at 13:16

## 2019-01-01 ASSESSMENT — ENCOUNTER SYMPTOMS
FOCAL WEAKNESS: 0
VOMITING: 1
TREMORS: 0
CHILLS: 0
CHILLS: 0
FEVER: 0
PHOTOPHOBIA: 0
HEARTBURN: 0
PHOTOPHOBIA: 0
ORTHOPNEA: 0
CHILLS: 0
HEMOPTYSIS: 0
PALPITATIONS: 0
DOUBLE VISION: 0
CLAUDICATION: 0
CONSTIPATION: 0
SENSORY CHANGE: 0
COUGH: 0
FEVER: 0
HEARTBURN: 0
SENSORY CHANGE: 0
DIARRHEA: 1
SENSORY CHANGE: 0
HEMOPTYSIS: 0
HEMOPTYSIS: 0
DOUBLE VISION: 0
PHOTOPHOBIA: 0
COUGH: 0
SHORTNESS OF BREATH: 0
TINGLING: 0
ABDOMINAL PAIN: 0
FOCAL WEAKNESS: 0
DIZZINESS: 0
BLURRED VISION: 0
TINGLING: 0
DIZZINESS: 0
HALLUCINATIONS: 0
SPUTUM PRODUCTION: 0
EYE PAIN: 0
DIARRHEA: 0
DOUBLE VISION: 0
TINGLING: 0
CONSTIPATION: 0
PHOTOPHOBIA: 0
FLANK PAIN: 0
EYE DISCHARGE: 0
COUGH: 0
BLURRED VISION: 0
CHILLS: 0
TINGLING: 0
NECK PAIN: 0
COUGH: 0
CONSTIPATION: 0
EYE PAIN: 0
EYE DISCHARGE: 0
CHILLS: 0
DIARRHEA: 1
WHEEZING: 0
HEARTBURN: 0
FEVER: 0
HEADACHES: 0
DIZZINESS: 0
SPUTUM PRODUCTION: 0
SPUTUM PRODUCTION: 0
NAUSEA: 1
CHILLS: 0
ORTHOPNEA: 0
SEIZURES: 0
HEARTBURN: 0
PHOTOPHOBIA: 0
SENSORY CHANGE: 0
WEIGHT LOSS: 0
VOMITING: 1
DIARRHEA: 1
COUGH: 0
VOMITING: 1
BRUISES/BLEEDS EASILY: 0
ORTHOPNEA: 0
TINGLING: 0
BACK PAIN: 0
DIARRHEA: 1
PALPITATIONS: 0
BLURRED VISION: 0
DIAPHORESIS: 0
HEARTBURN: 0
CONSTIPATION: 0
TREMORS: 0
EYE DISCHARGE: 0
MYALGIAS: 0
SEIZURES: 0
DOUBLE VISION: 0
DIZZINESS: 1
VOMITING: 1
DIARRHEA: 1
SENSORY CHANGE: 0
SHORTNESS OF BREATH: 0
BLURRED VISION: 0
NECK PAIN: 0
SHORTNESS OF BREATH: 0
HEADACHES: 0
COUGH: 0
DIAPHORESIS: 0
PALPITATIONS: 0
FOCAL WEAKNESS: 0
SPUTUM PRODUCTION: 0
ORTHOPNEA: 0
MYALGIAS: 0
HEADACHES: 0
WEIGHT LOSS: 0
SPUTUM PRODUCTION: 0
CLAUDICATION: 0
DOUBLE VISION: 0
CHILLS: 0
HEMOPTYSIS: 0
FEVER: 0
FEVER: 0
WEIGHT LOSS: 1
PALPITATIONS: 0
FEVER: 0
MYALGIAS: 0
SPEECH CHANGE: 0
WHEEZING: 0
ABDOMINAL PAIN: 0
NAUSEA: 1
MYALGIAS: 0
HEMOPTYSIS: 0
ABDOMINAL PAIN: 0
CLAUDICATION: 0
SENSORY CHANGE: 0
ABDOMINAL PAIN: 1
DIZZINESS: 0
ABDOMINAL PAIN: 1
SENSORY CHANGE: 0
BLURRED VISION: 0
HEMOPTYSIS: 0
COUGH: 0
ORTHOPNEA: 0
PND: 0
CLAUDICATION: 0
DIAPHORESIS: 0
WEIGHT LOSS: 0
ABDOMINAL PAIN: 0
WEIGHT LOSS: 0
PHOTOPHOBIA: 0
VOMITING: 1
VOMITING: 0
SEIZURES: 0
SHORTNESS OF BREATH: 0
TINGLING: 0
NAUSEA: 1
NAUSEA: 1
DIZZINESS: 0
HEADACHES: 0
DIAPHORESIS: 0
DIARRHEA: 0
WEIGHT LOSS: 0
CONSTIPATION: 0
DOUBLE VISION: 0
PND: 0
HEADACHES: 0
WHEEZING: 0
PALPITATIONS: 0
PALPITATIONS: 0
BACK PAIN: 0
CONSTIPATION: 0
BLURRED VISION: 0
NAUSEA: 1
SEIZURES: 0
HEARTBURN: 0
NAUSEA: 1
BLURRED VISION: 0
NAUSEA: 1
HEADACHES: 0
VOMITING: 0
DOUBLE VISION: 0
FOCAL WEAKNESS: 0
ORTHOPNEA: 0
DIAPHORESIS: 0
FOCAL WEAKNESS: 0
HEMOPTYSIS: 0
SPUTUM PRODUCTION: 0
NECK PAIN: 0
DEPRESSION: 0
DIZZINESS: 0
DIAPHORESIS: 0
HEARTBURN: 0
ABDOMINAL PAIN: 0
PALPITATIONS: 0
PND: 0
WEAKNESS: 0
EYE PAIN: 0
FEVER: 0
CLAUDICATION: 0
WEIGHT LOSS: 1

## 2019-01-01 ASSESSMENT — LIFESTYLE VARIABLES
HAVE YOU EVER FELT YOU SHOULD CUT DOWN ON YOUR DRINKING: NO
AVERAGE NUMBER OF DAYS PER WEEK YOU HAVE A DRINK CONTAINING ALCOHOL: 0
EVER HAD A DRINK FIRST THING IN THE MORNING TO STEADY YOUR NERVES TO GET RID OF A HANGOVER: NO
ON A TYPICAL DAY WHEN YOU DRINK ALCOHOL HOW MANY DRINKS DO YOU HAVE: 0
TOTAL SCORE: 0
SUBSTANCE_ABUSE: 0
CONSUMPTION TOTAL: NEGATIVE
HOW MANY TIMES IN THE PAST YEAR HAVE YOU HAD 5 OR MORE DRINKS IN A DAY: 0
TOTAL SCORE: 0
EVER_SMOKED: NEVER
HAVE PEOPLE ANNOYED YOU BY CRITICIZING YOUR DRINKING: NO
ALCOHOL_USE: NO
DOES PATIENT WANT TO STOP DRINKING: NO
EVER FELT BAD OR GUILTY ABOUT YOUR DRINKING: NO
TOTAL SCORE: 0

## 2019-01-01 ASSESSMENT — COGNITIVE AND FUNCTIONAL STATUS - GENERAL
MOVING FROM LYING ON BACK TO SITTING ON SIDE OF FLAT BED: A LITTLE
SUGGESTED CMS G CODE MODIFIER MOBILITY: CK
WALKING IN HOSPITAL ROOM: A LITTLE
STANDING UP FROM CHAIR USING ARMS: A LITTLE
SUGGESTED CMS G CODE MODIFIER DAILY ACTIVITY: CJ
DRESSING REGULAR LOWER BODY CLOTHING: A LITTLE
DAILY ACTIVITIY SCORE: 22
MOBILITY SCORE: 19
HELP NEEDED FOR BATHING: A LITTLE
CLIMB 3 TO 5 STEPS WITH RAILING: A LOT

## 2019-01-01 ASSESSMENT — PATIENT HEALTH QUESTIONNAIRE - PHQ9
2. FEELING DOWN, DEPRESSED, IRRITABLE, OR HOPELESS: NOT AT ALL
1. LITTLE INTEREST OR PLEASURE IN DOING THINGS: NOT AT ALL
1. LITTLE INTEREST OR PLEASURE IN DOING THINGS: NOT AT ALL
SUM OF ALL RESPONSES TO PHQ9 QUESTIONS 1 AND 2: 0
2. FEELING DOWN, DEPRESSED, IRRITABLE, OR HOPELESS: NOT AT ALL
SUM OF ALL RESPONSES TO PHQ9 QUESTIONS 1 AND 2: 0

## 2019-01-01 ASSESSMENT — PAIN SCALES - WONG BAKER
WONGBAKER_NUMERICALRESPONSE: DOESN'T HURT AT ALL
WONGBAKER_NUMERICALRESPONSE: HURTS EVEN MORE
WONGBAKER_NUMERICALRESPONSE: DOESN'T HURT AT ALL
WONGBAKER_NUMERICALRESPONSE: HURTS EVEN MORE

## 2019-01-01 ASSESSMENT — PAIN SCALES - GENERAL: PAINLEVEL: 4=SLIGHT-MODERATE PAIN

## 2019-03-06 ENCOUNTER — HOSPITAL ENCOUNTER (INPATIENT)
Dept: HOSPITAL 8 - ED | Age: 69
LOS: 5 days | Discharge: HOME | DRG: 357 | End: 2019-03-11
Attending: HOSPITALIST | Admitting: HOSPITALIST
Payer: COMMERCIAL

## 2019-03-06 VITALS — HEIGHT: 60 IN | BODY MASS INDEX: 24.54 KG/M2 | WEIGHT: 125 LBS

## 2019-03-06 VITALS — SYSTOLIC BLOOD PRESSURE: 158 MMHG | DIASTOLIC BLOOD PRESSURE: 73 MMHG

## 2019-03-06 DIAGNOSIS — D72.829: ICD-10-CM

## 2019-03-06 DIAGNOSIS — Z87.891: ICD-10-CM

## 2019-03-06 DIAGNOSIS — K56.51: ICD-10-CM

## 2019-03-06 DIAGNOSIS — Z88.0: ICD-10-CM

## 2019-03-06 DIAGNOSIS — G62.9: ICD-10-CM

## 2019-03-06 DIAGNOSIS — Z90.711: ICD-10-CM

## 2019-03-06 DIAGNOSIS — D64.9: ICD-10-CM

## 2019-03-06 DIAGNOSIS — C77.9: ICD-10-CM

## 2019-03-06 DIAGNOSIS — C18.9: Primary | ICD-10-CM

## 2019-03-06 DIAGNOSIS — Z92.21: ICD-10-CM

## 2019-03-06 DIAGNOSIS — Z90.49: ICD-10-CM

## 2019-03-06 DIAGNOSIS — Z91.018: ICD-10-CM

## 2019-03-06 LAB
ALBUMIN SERPL-MCNC: 3.4 G/DL (ref 3.4–5)
ALP SERPL-CCNC: 141 U/L (ref 45–117)
ALT SERPL-CCNC: 13 U/L (ref 12–78)
ANION GAP SERPL CALC-SCNC: 7 MMOL/L (ref 5–15)
BASOPHILS # BLD AUTO: 0.11 X10^3/UL (ref 0–0.1)
BASOPHILS NFR BLD AUTO: 1 % (ref 0–1)
BILIRUB SERPL-MCNC: 0.3 MG/DL (ref 0.2–1)
CALCIUM SERPL-MCNC: 8.9 MG/DL (ref 8.5–10.1)
CHLORIDE SERPL-SCNC: 105 MMOL/L (ref 98–107)
CREAT SERPL-MCNC: 0.67 MG/DL (ref 0.55–1.02)
CULTURE INDICATED?: NO
EOSINOPHIL # BLD AUTO: 0.07 X10^3/UL (ref 0–0.4)
EOSINOPHIL NFR BLD AUTO: 1 % (ref 1–7)
ERYTHROCYTE [DISTWIDTH] IN BLOOD BY AUTOMATED COUNT: 14.9 % (ref 9.6–15.2)
LYMPHOCYTES # BLD AUTO: 2.21 X10^3/UL (ref 1–3.4)
LYMPHOCYTES NFR BLD AUTO: 17 % (ref 22–44)
MCH RBC QN AUTO: 27.1 PG (ref 27–34.8)
MCHC RBC AUTO-ENTMCNC: 33.4 G/DL (ref 32.4–35.8)
MCV RBC AUTO: 81.2 FL (ref 80–100)
MD: NO
MICROSCOPIC: (no result)
MONOCYTES # BLD AUTO: 0.41 X10^3/UL (ref 0.2–0.8)
MONOCYTES NFR BLD AUTO: 3 % (ref 2–9)
NEUTROPHILS # BLD AUTO: 9.89 X10^3/UL (ref 1.8–6.8)
NEUTROPHILS NFR BLD AUTO: 78 % (ref 42–75)
PLATELET # BLD AUTO: 435 X10^3/UL (ref 130–400)
PMV BLD AUTO: 7.6 FL (ref 7.4–10.4)
PROT SERPL-MCNC: 7.5 G/DL (ref 6.4–8.2)
RBC # BLD AUTO: 4.28 X10^6/UL (ref 3.82–5.3)

## 2019-03-06 PROCEDURE — 74177 CT ABD & PELVIS W/CONTRAST: CPT

## 2019-03-06 PROCEDURE — 88305 TISSUE EXAM BY PATHOLOGIST: CPT

## 2019-03-06 PROCEDURE — 99156 MOD SED OTH PHYS/QHP 5/>YRS: CPT

## 2019-03-06 PROCEDURE — 83690 ASSAY OF LIPASE: CPT

## 2019-03-06 PROCEDURE — 81003 URINALYSIS AUTO W/O SCOPE: CPT

## 2019-03-06 PROCEDURE — 49180 BIOPSY ABDOMINAL MASS: CPT

## 2019-03-06 PROCEDURE — 99157 MOD SED OTHER PHYS/QHP EA: CPT

## 2019-03-06 PROCEDURE — 77012 CT SCAN FOR NEEDLE BIOPSY: CPT

## 2019-03-06 PROCEDURE — 82728 ASSAY OF FERRITIN: CPT

## 2019-03-06 PROCEDURE — 96374 THER/PROPH/DIAG INJ IV PUSH: CPT

## 2019-03-06 PROCEDURE — 85025 COMPLETE CBC W/AUTO DIFF WBC: CPT

## 2019-03-06 PROCEDURE — 80053 COMPREHEN METABOLIC PANEL: CPT

## 2019-03-06 PROCEDURE — 36415 COLL VENOUS BLD VENIPUNCTURE: CPT

## 2019-03-06 PROCEDURE — 82378 CARCINOEMBRYONIC ANTIGEN: CPT

## 2019-03-06 NOTE — NUR
PT PRESENTS TO ED C/O ABD PAIN X APPROX 2 MOS. PT UNABLE TO MAKE APPT WITH PCP. 
STATES PAIN/DISCOMFORT PROGRESSIVELY WORSENING. ABLE TO TOLERATE MEALS. HAS 
IRREGULAR BM'S DAILY. PT STATES SHE WAS SEEN AT  RECENTLY AND WAS TOLD OF 
POSSBLE SBO. PT RESTING ON GURNEY. CALL LIGHT IN REACH. IV ESTABLISHED. 
AWAITING CT ABD. SBAR REPORT TO DAIJA VALENZUELA.

## 2019-03-06 NOTE — NUR
PT BEDSIDE REPORT FROM ISAC VALENZUELA. THIS RN TO ASSUME CARE OF PT. AWAITING UA. 
STATED URINATING PTA TO ROOM. AWARE OF NEED FOR UA.

## 2019-03-07 VITALS — SYSTOLIC BLOOD PRESSURE: 154 MMHG | DIASTOLIC BLOOD PRESSURE: 64 MMHG

## 2019-03-07 VITALS — SYSTOLIC BLOOD PRESSURE: 116 MMHG | DIASTOLIC BLOOD PRESSURE: 61 MMHG

## 2019-03-07 VITALS — DIASTOLIC BLOOD PRESSURE: 52 MMHG | SYSTOLIC BLOOD PRESSURE: 117 MMHG

## 2019-03-07 VITALS — DIASTOLIC BLOOD PRESSURE: 71 MMHG | SYSTOLIC BLOOD PRESSURE: 148 MMHG

## 2019-03-07 VITALS — DIASTOLIC BLOOD PRESSURE: 66 MMHG | SYSTOLIC BLOOD PRESSURE: 120 MMHG

## 2019-03-07 RX ADMIN — ONDANSETRON PRN MG: 2 INJECTION, SOLUTION INTRAMUSCULAR; INTRAVENOUS at 19:35

## 2019-03-07 RX ADMIN — SODIUM CHLORIDE SCH MLS/HR: 0.9 INJECTION, SOLUTION INTRAVENOUS at 10:29

## 2019-03-07 RX ADMIN — HYDROMORPHONE HYDROCHLORIDE PRN MG: 2 INJECTION INTRAMUSCULAR; INTRAVENOUS; SUBCUTANEOUS at 21:21

## 2019-03-07 RX ADMIN — HYDROMORPHONE HYDROCHLORIDE PRN MG: 2 INJECTION INTRAMUSCULAR; INTRAVENOUS; SUBCUTANEOUS at 17:52

## 2019-03-07 RX ADMIN — SODIUM CHLORIDE SCH MLS/HR: 0.9 INJECTION, SOLUTION INTRAVENOUS at 21:21

## 2019-03-07 RX ADMIN — ENOXAPARIN SODIUM SCH MG: 40 INJECTION SUBCUTANEOUS at 21:21

## 2019-03-07 RX ADMIN — HYDROMORPHONE HYDROCHLORIDE PRN MG: 2 INJECTION INTRAMUSCULAR; INTRAVENOUS; SUBCUTANEOUS at 00:33

## 2019-03-07 RX ADMIN — ENOXAPARIN SODIUM SCH MG: 40 INJECTION SUBCUTANEOUS at 11:53

## 2019-03-07 RX ADMIN — SODIUM CHLORIDE SCH MLS/HR: 0.9 INJECTION, SOLUTION INTRAVENOUS at 00:33

## 2019-03-07 RX ADMIN — HYDROMORPHONE HYDROCHLORIDE PRN MG: 2 INJECTION INTRAMUSCULAR; INTRAVENOUS; SUBCUTANEOUS at 11:56

## 2019-03-07 RX ADMIN — HYDROMORPHONE HYDROCHLORIDE PRN MG: 2 INJECTION INTRAMUSCULAR; INTRAVENOUS; SUBCUTANEOUS at 07:37

## 2019-03-08 VITALS — DIASTOLIC BLOOD PRESSURE: 55 MMHG | SYSTOLIC BLOOD PRESSURE: 139 MMHG

## 2019-03-08 VITALS — DIASTOLIC BLOOD PRESSURE: 68 MMHG | SYSTOLIC BLOOD PRESSURE: 124 MMHG

## 2019-03-08 VITALS — DIASTOLIC BLOOD PRESSURE: 69 MMHG | SYSTOLIC BLOOD PRESSURE: 143 MMHG

## 2019-03-08 VITALS — DIASTOLIC BLOOD PRESSURE: 73 MMHG | SYSTOLIC BLOOD PRESSURE: 150 MMHG

## 2019-03-08 VITALS — DIASTOLIC BLOOD PRESSURE: 47 MMHG | SYSTOLIC BLOOD PRESSURE: 113 MMHG

## 2019-03-08 LAB
ALBUMIN SERPL-MCNC: 2.8 G/DL (ref 3.4–5)
ALP SERPL-CCNC: 121 U/L (ref 45–117)
ALT SERPL-CCNC: 10 U/L (ref 12–78)
ANION GAP SERPL CALC-SCNC: 7 MMOL/L (ref 5–15)
BASOPHILS # BLD AUTO: 0.06 X10^3/UL (ref 0–0.1)
BASOPHILS NFR BLD AUTO: 1 % (ref 0–1)
BILIRUB SERPL-MCNC: 0.6 MG/DL (ref 0.2–1)
CALCIUM SERPL-MCNC: 8.1 MG/DL (ref 8.5–10.1)
CHLORIDE SERPL-SCNC: 112 MMOL/L (ref 98–107)
CREAT SERPL-MCNC: 0.54 MG/DL (ref 0.55–1.02)
EOSINOPHIL # BLD AUTO: 0.1 X10^3/UL (ref 0–0.4)
EOSINOPHIL NFR BLD AUTO: 1 % (ref 1–7)
ERYTHROCYTE [DISTWIDTH] IN BLOOD BY AUTOMATED COUNT: 14.7 % (ref 9.6–15.2)
LYMPHOCYTES # BLD AUTO: 1.99 X10^3/UL (ref 1–3.4)
LYMPHOCYTES NFR BLD AUTO: 21 % (ref 22–44)
MCH RBC QN AUTO: 27.4 PG (ref 27–34.8)
MCHC RBC AUTO-ENTMCNC: 33.6 G/DL (ref 32.4–35.8)
MCV RBC AUTO: 81.6 FL (ref 80–100)
MD: NO
MONOCYTES # BLD AUTO: 0.45 X10^3/UL (ref 0.2–0.8)
MONOCYTES NFR BLD AUTO: 5 % (ref 2–9)
NEUTROPHILS # BLD AUTO: 6.85 X10^3/UL (ref 1.8–6.8)
NEUTROPHILS NFR BLD AUTO: 73 % (ref 42–75)
PLATELET # BLD AUTO: 357 X10^3/UL (ref 130–400)
PMV BLD AUTO: 8.1 FL (ref 7.4–10.4)
PROT SERPL-MCNC: 6.4 G/DL (ref 6.4–8.2)
RBC # BLD AUTO: 4 X10^6/UL (ref 3.82–5.3)

## 2019-03-08 PROCEDURE — 0WBF3ZX EXCISION OF ABDOMINAL WALL, PERCUTANEOUS APPROACH, DIAGNOSTIC: ICD-10-PCS | Performed by: RADIOLOGY

## 2019-03-08 RX ADMIN — LORAZEPAM PRN MG: 2 INJECTION INTRAMUSCULAR; INTRAVENOUS at 07:44

## 2019-03-08 RX ADMIN — HYDROMORPHONE HYDROCHLORIDE PRN MG: 2 INJECTION INTRAMUSCULAR; INTRAVENOUS; SUBCUTANEOUS at 02:25

## 2019-03-08 RX ADMIN — SODIUM CHLORIDE SCH MLS/HR: 0.9 INJECTION, SOLUTION INTRAVENOUS at 17:31

## 2019-03-08 RX ADMIN — HYDROMORPHONE HYDROCHLORIDE PRN MG: 2 INJECTION INTRAMUSCULAR; INTRAVENOUS; SUBCUTANEOUS at 14:45

## 2019-03-08 RX ADMIN — HYDROMORPHONE HYDROCHLORIDE PRN MG: 2 INJECTION INTRAMUSCULAR; INTRAVENOUS; SUBCUTANEOUS at 19:30

## 2019-03-08 RX ADMIN — ONDANSETRON PRN MG: 2 INJECTION, SOLUTION INTRAMUSCULAR; INTRAVENOUS at 02:24

## 2019-03-08 RX ADMIN — HYDROMORPHONE HYDROCHLORIDE PRN MG: 2 INJECTION INTRAMUSCULAR; INTRAVENOUS; SUBCUTANEOUS at 07:18

## 2019-03-08 RX ADMIN — SODIUM CHLORIDE SCH MLS/HR: 0.9 INJECTION, SOLUTION INTRAVENOUS at 06:03

## 2019-03-09 VITALS — DIASTOLIC BLOOD PRESSURE: 71 MMHG | SYSTOLIC BLOOD PRESSURE: 130 MMHG

## 2019-03-09 VITALS — DIASTOLIC BLOOD PRESSURE: 69 MMHG | SYSTOLIC BLOOD PRESSURE: 147 MMHG

## 2019-03-09 VITALS — DIASTOLIC BLOOD PRESSURE: 73 MMHG | SYSTOLIC BLOOD PRESSURE: 156 MMHG

## 2019-03-09 VITALS — DIASTOLIC BLOOD PRESSURE: 64 MMHG | SYSTOLIC BLOOD PRESSURE: 150 MMHG

## 2019-03-09 VITALS — DIASTOLIC BLOOD PRESSURE: 59 MMHG | SYSTOLIC BLOOD PRESSURE: 154 MMHG

## 2019-03-09 RX ADMIN — SODIUM CHLORIDE SCH MLS/HR: 0.9 INJECTION, SOLUTION INTRAVENOUS at 05:16

## 2019-03-09 RX ADMIN — ONDANSETRON PRN MG: 2 INJECTION, SOLUTION INTRAMUSCULAR; INTRAVENOUS at 07:30

## 2019-03-09 RX ADMIN — HYDROCODONE BITARTRATE AND ACETAMINOPHEN PRN TAB: 5; 325 TABLET ORAL at 11:10

## 2019-03-09 RX ADMIN — POLYETHYLENE GLYCOL 3350 SCH GM: 17 POWDER, FOR SOLUTION ORAL at 09:00

## 2019-03-09 RX ADMIN — HYDROMORPHONE HYDROCHLORIDE PRN MG: 2 INJECTION INTRAMUSCULAR; INTRAVENOUS; SUBCUTANEOUS at 07:30

## 2019-03-09 RX ADMIN — SODIUM CHLORIDE SCH MLS/HR: 0.9 INJECTION, SOLUTION INTRAVENOUS at 15:02

## 2019-03-09 RX ADMIN — HYDROMORPHONE HYDROCHLORIDE PRN MG: 2 INJECTION INTRAMUSCULAR; INTRAVENOUS; SUBCUTANEOUS at 01:21

## 2019-03-09 RX ADMIN — HYDROMORPHONE HYDROCHLORIDE PRN MG: 2 INJECTION INTRAMUSCULAR; INTRAVENOUS; SUBCUTANEOUS at 15:02

## 2019-03-09 RX ADMIN — ENOXAPARIN SODIUM SCH MG: 40 INJECTION SUBCUTANEOUS at 07:30

## 2019-03-09 RX ADMIN — HYDROMORPHONE HYDROCHLORIDE PRN MG: 2 INJECTION INTRAMUSCULAR; INTRAVENOUS; SUBCUTANEOUS at 19:31

## 2019-03-10 VITALS — DIASTOLIC BLOOD PRESSURE: 70 MMHG | SYSTOLIC BLOOD PRESSURE: 177 MMHG

## 2019-03-10 VITALS — SYSTOLIC BLOOD PRESSURE: 126 MMHG | DIASTOLIC BLOOD PRESSURE: 66 MMHG

## 2019-03-10 VITALS — DIASTOLIC BLOOD PRESSURE: 63 MMHG | SYSTOLIC BLOOD PRESSURE: 145 MMHG

## 2019-03-10 VITALS — SYSTOLIC BLOOD PRESSURE: 122 MMHG | DIASTOLIC BLOOD PRESSURE: 55 MMHG

## 2019-03-10 RX ADMIN — HYDROCODONE BITARTRATE AND ACETAMINOPHEN PRN TAB: 5; 325 TABLET ORAL at 17:02

## 2019-03-10 RX ADMIN — SODIUM CHLORIDE SCH MLS/HR: 0.9 INJECTION, SOLUTION INTRAVENOUS at 20:55

## 2019-03-10 RX ADMIN — ONDANSETRON PRN MG: 2 INJECTION, SOLUTION INTRAMUSCULAR; INTRAVENOUS at 20:48

## 2019-03-10 RX ADMIN — HYDROCODONE BITARTRATE AND ACETAMINOPHEN PRN TAB: 5; 325 TABLET ORAL at 13:06

## 2019-03-10 RX ADMIN — ENOXAPARIN SODIUM SCH MG: 40 INJECTION SUBCUTANEOUS at 07:46

## 2019-03-10 RX ADMIN — SODIUM CHLORIDE SCH MLS/HR: 0.9 INJECTION, SOLUTION INTRAVENOUS at 11:13

## 2019-03-10 RX ADMIN — LORAZEPAM PRN MG: 2 INJECTION INTRAMUSCULAR; INTRAVENOUS at 22:50

## 2019-03-10 RX ADMIN — POLYETHYLENE GLYCOL 3350 SCH GM: 17 POWDER, FOR SOLUTION ORAL at 07:46

## 2019-03-10 RX ADMIN — SODIUM CHLORIDE SCH MLS/HR: 0.9 INJECTION, SOLUTION INTRAVENOUS at 00:20

## 2019-03-10 RX ADMIN — ONDANSETRON PRN MG: 2 INJECTION, SOLUTION INTRAMUSCULAR; INTRAVENOUS at 14:25

## 2019-03-10 RX ADMIN — HYDROCODONE BITARTRATE AND ACETAMINOPHEN PRN TAB: 5; 325 TABLET ORAL at 05:07

## 2019-03-10 RX ADMIN — HYDROCODONE BITARTRATE AND ACETAMINOPHEN PRN TAB: 5; 325 TABLET ORAL at 00:20

## 2019-03-11 VITALS — SYSTOLIC BLOOD PRESSURE: 144 MMHG | DIASTOLIC BLOOD PRESSURE: 68 MMHG

## 2019-03-11 VITALS — SYSTOLIC BLOOD PRESSURE: 161 MMHG | DIASTOLIC BLOOD PRESSURE: 77 MMHG

## 2019-03-11 VITALS — DIASTOLIC BLOOD PRESSURE: 67 MMHG | SYSTOLIC BLOOD PRESSURE: 146 MMHG

## 2019-03-11 RX ADMIN — POLYETHYLENE GLYCOL 3350 SCH GM: 17 POWDER, FOR SOLUTION ORAL at 08:27

## 2019-03-11 RX ADMIN — HYDROCODONE BITARTRATE AND ACETAMINOPHEN PRN TAB: 5; 325 TABLET ORAL at 13:15

## 2019-03-11 RX ADMIN — ENOXAPARIN SODIUM SCH MG: 40 INJECTION SUBCUTANEOUS at 08:27

## 2019-03-11 RX ADMIN — SODIUM CHLORIDE SCH MLS/HR: 0.9 INJECTION, SOLUTION INTRAVENOUS at 08:27

## 2019-03-28 ENCOUNTER — HOSPITAL ENCOUNTER (OUTPATIENT)
Dept: HOSPITAL 8 - PETCFH | Age: 69
Discharge: HOME | End: 2019-03-28
Attending: INTERNAL MEDICINE
Payer: COMMERCIAL

## 2019-03-28 DIAGNOSIS — Z91.018: ICD-10-CM

## 2019-03-28 DIAGNOSIS — R19.09: ICD-10-CM

## 2019-03-28 DIAGNOSIS — Z88.0: ICD-10-CM

## 2019-03-28 DIAGNOSIS — K76.89: ICD-10-CM

## 2019-03-28 DIAGNOSIS — N28.1: ICD-10-CM

## 2019-03-28 DIAGNOSIS — C18.7: Primary | ICD-10-CM

## 2019-03-28 DIAGNOSIS — I70.0: ICD-10-CM

## 2019-03-28 PROCEDURE — 78815 PET IMAGE W/CT SKULL-THIGH: CPT

## 2019-03-28 PROCEDURE — A9552 F18 FDG: HCPCS

## 2019-07-02 PROBLEM — C77.2 COLON CANCER METASTASIZED TO INTRA-ABDOMINAL LYMPH NODE (HCC): Status: ACTIVE | Noted: 2019-01-01

## 2019-07-02 PROBLEM — C18.9 COLON CANCER METASTASIZED TO INTRA-ABDOMINAL LYMPH NODE (HCC): Status: ACTIVE | Noted: 2019-01-01

## 2019-07-16 ENCOUNTER — HOSPITAL ENCOUNTER (OUTPATIENT)
Dept: HOSPITAL 8 - CFH | Age: 69
Discharge: HOME | End: 2019-07-16
Attending: INTERNAL MEDICINE
Payer: MEDICARE

## 2019-07-16 DIAGNOSIS — C18.7: Primary | ICD-10-CM

## 2019-07-16 DIAGNOSIS — R19.09: ICD-10-CM

## 2019-07-16 DIAGNOSIS — K63.89: ICD-10-CM

## 2019-07-16 DIAGNOSIS — N28.1: ICD-10-CM

## 2019-07-16 DIAGNOSIS — K40.90: ICD-10-CM

## 2019-07-16 PROCEDURE — 74177 CT ABD & PELVIS W/CONTRAST: CPT

## 2019-07-16 PROCEDURE — 71260 CT THORAX DX C+: CPT

## 2019-07-16 PROCEDURE — 82565 ASSAY OF CREATININE: CPT

## 2019-07-17 NOTE — PROGRESS NOTES
Pharmacy Chemotherapy Verification  Patient Name: Minra Black   Dx: Colorectal CA  Protocol: FOLFIRI + Bevacizumab  Irinotecan 180 mg/m2 IV over 30-90 minutes on Day 1 concurrent with   Leucovorin 400 mg/m2 IV over 30-90 minutes on Day 1 followed by   Fluorouracil 400 mg/m2 IV push on Day 1 followed by  Fluorouracil 1500 mg/m2 IV continuous infusion daily on Days 1-2 (2400 mg/m2 IV over 46-48 hours)  Bevacizumab 5 mg/kg IV on Day 1  14 day cycle until disease progression or unacceptable toxicity  NCCN Guidelines for Colon Cancer.V.2.2018  Matt BARRAZA et al. Lancet Oncol. 2014;15(10):1065-75  Abilio RHOADES, et al. J Clin Oncol.2007;25(30):8480-34    Allergies:  Pcn [penicillins]     BP (!) 92/67   Pulse 65   Temp 36.1 °C (97 °F) (Temporal)   Resp 18   Ht 1.524 m (5')   Wt 48.2 kg (106 lb 4.2 oz)   SpO2 98%   BMI 20.75 kg/m²  Body surface area is 1.43 meters squared.    Labs 7/17/19  ANC 4880 Hgb 11.5 Plt 293k  SCr 0.71 CrCl 55.4 mL/min   AST/ALT/AP = 10/6/113 Tbili = 0.3     7/17/2019 12:30   POC Protein 30 (A)     Drug Order   (Drug name, dose, route, IV Fluid & volume, frequency, number of doses) Cycle: 7 (1st cycle @ [facility])      Previous treatment: Cycles 1-6 given at outside facility     Medication = Irinotecan  Base Dose = 180 mg/m2  Calc Dose: Base Dose x 1.43 m2 = 257.4 mg  Final Dose = 257.4 mg  Route = IV  Fluid & Volume = D5W 500 mL  Admin Duration = Over 90 minutes          <10% difference, OK to treat with final dose   Medication = Leucovorin   Base Dose = 400 mg/m2  Calc Dose: Base Dose x 1.43 m2 = 572 mg  Final Dose = 572 mg  Route = IV  Fluid & Volume = D5W 250 mL  Admin Duration = Over 90 minutes          <10% difference, OK to treat with final dose    Medication = Fluorouracil   Base Dose = 400 mg/m2   Calc Dose:Base Dose x 1.43 m2 = 572 mg  Final Dose = 570 mg  Route = IV  Fluid & Volume = 50 mg/mL; 11.4 mL  Admin Duration = Over 5 minutes          <10% difference, OK to treat with final  dose    Medication = Fluorouracil   Base Dose= 2400 mg/m2  Calc Dose: Base Dose x 1.43 m2 = 3432 mg  Final Dose = 3430 mg  Route = IV  Fluid & Volume = 50 mg/mL; 68.6 mL (+ 3 mL overfill)  Admin Duration = Over 46 hours @ 1.5 mL/hr          <10% difference, OK to treat with final dose    Medication = Bevacizumab  Base Dose = 5 mg/kg  Calc Dose: Base Dose x 48.2 kg = 241 mg  Final Dose = 241 mg  Route = IV  Fluid & Volume =  mL  Admin Duration = Over 90 minutes          <10% difference, OK to treat with final dose      By my signature below, I confirm this process was performed independently with the BSA and all final chemotherapy dosing calculations congruent. I have reviewed the above chemotherapy order and that my calculation of the final dose and BSA (when applicable) corroborate those calculations of the  pharmacist. Discrepancies of 5% or greater in the written dose have been addressed and documented within the EPIC Progress notes.    Signature: Alba West, PharmD, BCOP

## 2019-07-17 NOTE — PROGRESS NOTES
Chemotherapy Verification - SECONDARY RN       Height = 152.4cm  Weight = 48.2kg  BSA = 1.43m2       Medication: Irinotecan  Dose: 180mg/m2  Calculated Dose: 257.4mg                             (In mg/m2, AUC, mg/kg)     Medication: Adrucil  Dose: 400mg/m2  Calculated Dose: 572mg (bolus)                            (In mg/m2, AUC, mg/kg)    Medication: Avastin  Dose: 5mg/kg  Calculated Dose: 241mg                             (In mg/m2, AUC, mg/kg)    Medication: Adrucil  Dose: 2400mg/m2  Calculated Dose: 3432mg via CADD pump                             (In mg/m2, AUC, mg/kg)      I confirm that this process was performed independently.

## 2019-07-17 NOTE — PROGRESS NOTES
Pharmacy Chemotherapy Verification Note:      DX: colon cancer    Cycle 7 - received cycles 1-6 at other facility  Previous treatment = C6 7/2/19- [place]    Protocol: FOLFiri + bevacizumab  Irinotecan 180mg/m2 IV on day 1  Leucovorin 400 mg/m2 iv on day 1 concurrently with irinotecan  5- mg/m2 iv bolus d1 followed by 2400 mg/m2 iv over 46 hrs  bevacizumab 5mg/kg IV on day 1  q14 days for 8-12 cycles  NCCN Guidelines for Colon Cancer V.2.2018  Matt V Et al - Lancet Oncol. 2014 Sep;15(10):1065-75. Doi:  10.1016/-[id number]. Epub 2014 Jul 31.  Abilio RHOADES, et al - J Clin Oncol. 2007 Oct 20;25(30):4708-31.      BP (!) 92/67   Pulse 65   Temp 36.1 °C (97 °F) (Temporal)   Resp 18   Ht 1.524 m (5')   Wt 48.2 kg (106 lb 4.2 oz)   SpO2 98%   BMI 20.75 kg/m²     Body surface area is 1.43 meters squared.    All lab results, BP and urine protein within treatment parameters.       Irinotecan 180mg/m2 x 1.43m2 = 257.4mg    <10% difference, ok to treat with final dose = 257.4mg IV    Leucovorin 400 mg/m2 x 1.43m2 = 572mg    <10% difference, ok to treat with final dose = 572mg IV    5- mg/m2 x 1.43m2 = 572mg    <10% difference, ok to treat with final dose = 570mg IV    5-FU 2400 mg/m2 x 1.43m2 = 3432mg    <10% difference, ok to treat with final dose = 3430mg    CIVI over 46 hours via CADD pump at 1.5mL/hr    bevacizumab 5mg/kg x 48.2 kg = 241mg    Unable to round to vial size(> 10%) per dose rounding protocol.    ok to treat with final dose = 241mg IV      DU Santos Pharm.D.

## 2019-07-17 NOTE — PROGRESS NOTES
Chemotherapy Verification - PRIMARY RN      Height = 152.4 cm  Weight = 48.2 mg  BSA = 1.43 m2       Medication: Irinotecan  Dose: 180 mg/m2  Calculated Dose: 257.4 mg                              Medication: Leucovorin  Dose: 400 mg/m2  Calculated Dose: 572 mg                              Medication: Fluorouracil  Dose: 400 mg/m2  Calculated Dose: 572 mg                               Medication: Bevacizumab  Dose: 5 mg/kg  Calculated Dose: 241 mg                               Medication: Fluorouracil CADD Pump  Dose: 240 mg/m2  Calculated Dose: 3432 mg                               I confirm this process was performed independently with the BSA and all final chemotherapy dosing calculations congruent.  Any discrepancies of 10% or greater have been addressed with the chemotherapy pharmacist. The resolution of the discrepancy has been documented in the EPIC progress notes.

## 2019-07-18 NOTE — PROGRESS NOTES
Patient arrived to the infusion center 30 minutes late because she thought her appointment was at 11:45. Plan discussed with patient. Patient had no complaints today. Port site covered with EMLA cream. Port accessed with brisk blood return. Labs drawn and verified. UA verified and all within parameters to treat. Spoke with Dr. Brown's office about adding in Emend to patients premedications. All premedications given and chemotherapy given without incident. Patient attached to CADD pump and explained to patient. Patient discharged from infusion center with no apparent distress. Patient confirms appointment on 7/19/19 to have pump disconnected.

## 2019-07-20 NOTE — PROGRESS NOTES
Pt returns for 5FU pump de-access. Pump settings verified and vol noted to be at 0ml remaining. Pump removed, port flushed, blood return observed, and heparin instilled. Needle removed, tip intact. Gauze dressing applied. Pt requesting appts for labs to be done the day ahead of chemo. E-mail to be sent to scheduling. Pt knows when to return. Discharged home under care of family in no apparent distress.

## 2019-07-31 NOTE — PROGRESS NOTES
Chemotherapy Verification - PRIMARY RN      Height = 60.43 in  Weight = 108 lb  BSA = 1.45 m2       Medication: Irinotecan  Dose: 180 mg/m2  Calculated Dose: 261 mg                             (In mg/m2, AUC, mg/kg)     Medication: 5 FU Bolus  Dose: 400 mg/m2  Calculated Dose: 580 mg                             (In mg/m2, AUC, mg/kg)    Medication: Avastin  Dose: 5 mg/kg  Calculated Dose: 245.5 mg                             (In mg/m2, AUC, mg/kg)    Medication: 5 FU CADD PUMP  Dose: 2400 mg/m2  Calculated Dose: 3480 mg / 46 hrs continues infusion.                            (In mg/m2, AUC, mg/kg)    I confirm this process was performed independently with the BSA and all final chemotherapy dosing calculations congruent.  Any discrepancies of 10% or greater have been addressed with the chemotherapy pharmacist. The resolution of the discrepancy has been documented in the EPIC progress notes.

## 2019-07-31 NOTE — PROGRESS NOTES
Chemotherapy Verification - SECONDARY RN       Height = 60.43 inches  Weight = 49.1 kg  BSA = 1.44 m2       Medication: Irinotecan (Camptosar)  Dose: 180 mg/m2  Calculated Dose: 259.2 mg                             (In mg/m2, AUC, mg/kg)     Medication: Fluorouracil (Adrucil)  Dose: 400 mg/m2  Calculated Dose: 576 mg                             (In mg/m2, AUC, mg/kg)    Medication: Bevacizumab (Avastin)  Dose: 5 mg/kg  Calculated Dose: 245.5 mg                             (In mg/m2, AUC, mg/kg)    Medication: Fluorouracil (Adrucil)  Dose: 2400 mg/m2  Calculated Dose: 3456 mg via CADD pump for CIVI over 46 hrs                             (In mg/m2, AUC, mg/kg)        I confirm that this process was performed independently.

## 2019-07-31 NOTE — PROGRESS NOTES
into Infusions Services for Cycle 8 ( # 2 at [facility] ) of Irinotecan / 5 FU / Avastin for Colorectal Cancer. Pt denied having any new or acute complaints today, reports tolerating past treatments well. Port accessed in a sterile manner, had + blood return, flushed briskly.) Pt given Chemotherapy as prescribed, tolerated well, denied having any complaints during or after infusion. Port had + blood return after, flushed per [facility] policy, left accesses for home infusion. CADD PUMP connected at 1335. Discharge home with daughter to self care in [place]. Appointment confirm for next treatment.

## 2019-07-31 NOTE — PROGRESS NOTES
"Pharmacy Chemotherapy Verification Note:      DX: colon cancer    Cycle 8 - received cycles 1-6 at other facility  Previous treatment = C7 7/17/19    Protocol: FOLFiri + bevacizumab  Irinotecan 180mg/m2 IV on day 1  Leucovorin 400 mg/m2 iv on day 1 concurrently with irinotecan  5- mg/m2 iv bolus d1 followed by 2400 mg/m2 iv over 46 hrs  bevacizumab 5mg/kg IV on day 1  q14 days for 8-12 cycles  NCCN Guidelines for Colon Cancer V.2.2018  Matt V, Et al - Lancet Oncol. 2014 Sep;15(10):1065-75. Doi:  10.1016/-[id number]. Epub 2014 Jul 31.  Abilio CS, et al - J Clin Oncol. 2007 Oct 20;25(30):4730-86.      /60   Pulse 65   Temp 36.2 °C (97.2 °F) (Temporal)   Resp 18   Ht 1.535 m (5' 0.43\") Comment: no shoes.  Wt 49.1 kg (108 lb 3.9 oz)   SpO2 99%   BMI 20.84 kg/m²     Body surface area is 1.45 meters squared.    All lab results, BP and urine protein within treatment parameters.       Irinotecan 180mg/m2 x 1.45m2 = 261mg    <10% difference, ok to treat with final dose = 261mg IV    Leucovorin 400 mg/m2 x 1.45m2 = 580mg    <10% difference, ok to treat with final dose = 580mg IV    5- mg/m2 x 1.45m2 = 580mg    <10% difference, ok to treat with final dose = 580mg IV    5-FU 2400 mg/m2 x 1.45m2 = 3480mg    <10% difference, ok to treat with final dose = 3480mg    CIVI over 46 hours via CADD pump at 1.5mL/hr    bevacizumab 5mg/kg x 49.1kg = 245.5mg    Unable to round to vial size(> 10%) per dose rounding protocol.    ok to treat with final dose = 246mg IV      DU Santos Pharm.D.      "

## 2019-07-31 NOTE — PROGRESS NOTES
"Pharmacy Chemotherapy Verification  Patient Name: Mirna Black   Dx: Colorectal CA    Protocol: FOLFIRI + Bevacizumab  Irinotecan 180 mg/m2 IV over 30-90 minutes on Day 1 concurrent with   Leucovorin 400 mg/m2 IV over 30-90 minutes on Day 1 followed by   Fluorouracil 400 mg/m2 IV push on Day 1 followed by  Fluorouracil 1500 mg/m2 IV continuous infusion daily on Days 1-2 (2400 mg/m2 IV over 46-48 hours)  Bevacizumab 5 mg/kg IV on Day 1  14 day cycle until disease progression or unacceptable toxicity  NCCN Guidelines for Colon Cancer.V.2.2018  Matt V, et al. Lancet Oncol. 2014;15(10):1065-75  Abilio RHOADES, et al. J Clin Oncol.2007;25(30):0588-31    Allergies:  Pcn [penicillins]     /45   Pulse (!) 47   Temp 36.2 °C (97.2 °F) (Temporal)   Resp 18   Ht 1.535 m (5' 0.43\") Comment: no shoes.  Wt 49.1 kg (108 lb 3.9 oz)   SpO2 99%   BMI 20.84 kg/m²  Body surface area is 1.45 meters squared.    Labs from 7/31/19 reviewed - all within treatment parameters, including BP and urine protein 1+    Drug Order   (Drug name, dose, route, IV Fluid & volume, frequency, number of doses) Cycle: 8 (2nd cycle @ [facility])      Previous treatment: C7 on 7/17/19; Cycles 1-6 given at outside facility     Medication = Irinotecan  Base Dose = 180 mg/m2  Calc Dose: Base Dose x 1.45 m2 = 261 mg  Final Dose = 261 mg  Route = IV  Fluid & Volume = D5W 500 mL  Admin Duration = Over 90 minutes          <10% difference, OK to treat with final dose   Medication = Leucovorin   Base Dose = 400 mg/m2  Calc Dose: Base Dose x 1.45 m2 = 580 mg  Final Dose = 580 mg  Route = IV  Fluid & Volume = D5W 250 mL  Admin Duration = Over 90 minutes          <10% difference, OK to treat with final dose    Medication = Fluorouracil   Base Dose = 400 mg/m2   Calc Dose:Base Dose x 1.45 m2 = 580 mg  Final Dose = 580 mg  Route = IV  Fluid & Volume = 50 mg/mL= 11.6 mL  Admin Duration = Over 5 minutes          <10% difference, OK to treat with final dose  "   Medication = Fluorouracil   Base Dose= 2400 mg/m2  Calc Dose: Base Dose x 1.45 m2 = 3480 mg  Final Dose = 3480 mg  Route = IV  Fluid & Volume = 50 mg/mL; 69.6 mL (+ 3 mL overfill)  Admin Duration = Over 46 hours @ 1.5 mL/hr          <10% difference, OK to treat with final dose    Medication = Bevacizumab  Base Dose = 5 mg/kg  Calc Dose: Base Dose x 49.1 kg = 245.5 mg  Final Dose = 246 mg  Route = IV  Fluid & Volume =  mL  Admin Duration = Over 60 minutes          <10% difference, OK to treat with final dose      By my signature below, I confirm this process was performed independently with the BSA and all final chemotherapy dosing calculations congruent. I have reviewed the above chemotherapy order and that my calculation of the final dose and BSA (when applicable) corroborate those calculations of the  pharmacist. Discrepancies of 10% or greater in the written dose have been addressed and documented within the EPIC Progress notes.    Larissa De León, PharmD, BCOP

## 2019-08-01 NOTE — PROGRESS NOTES
On 8/1/2019 at 1126 this ONN called patient. No answer. Left voicemail with this ONN's call back number [phone].

## 2019-08-02 NOTE — PROGRESS NOTES
Here for 5 FU pump disconnect after 46 hrs of continuous infusion. See chemotherapy flow sheet for volume / dose administration. Port flushed per protocol, site d-accessed, needle intact compression dressing applied. Patient discharge home with care giver via wheelchair in [facility]. Next appointment confirmed.

## 2019-08-13 NOTE — PROGRESS NOTES
Patient presents for lab draw prior to chemotherapy tomorrow. Port accessed using sterile technique, blood drawn as ordered. Urine sample obtained. Port flushed per protocol and left accessed. Patient released in no acute distress.

## 2019-08-14 NOTE — PROGRESS NOTES
Chemotherapy Verification - SECONDARY RN       Height = 153 cm  Weight = 47.7 kg  BSA = 1.423 m2       Medication: Irinotecan  Dose: 180 mg/m2  Calculated Dose: 256.29 mg                             (In mg/m2, AUC, mg/kg)     Medication: 5FU  Dose: 400 mg/m2  Calculated Dose: 569.2 mg                             (In mg/m2, AUC, mg/kg)    Medication: Home infusion 5 FU  Dose: 2400 mg/m2  Calculated Dose: 3415.2 mg                             (In mg/m2, AUC, mg/kg)      I confirm that this process was performed independently.

## 2019-08-14 NOTE — PROGRESS NOTES
On 8/14/2019 this ONN met with patient in Infusion Services. Patient sleeping. Patient woke to sound of name being stated. This ONN apologized for waking patient. This ONN introduced self and explained role of navigator as added resource while patient in treatment at [facility]. Patient agreeable to speak with ONN. This ONN explained that this ONN was following up on patient's rated distress of 5/10. Patient states her concerns are mostly insurance related. Patient states she had a secondary insurance that lapsed recently. Patient states her co-pays are very high and patient cannot afford medication co-pays. This ONN asked if patient had followed up with financial resource advocates here at [facility]. Patient states she has not returned phone calls lately because patient's mother has been in the hospital at [facility]. This ONN agreed to see if a FRA was available to come speak with patient while patient present for infusion. This ONN provided patient with [facility] [employer] folder with resources enclosed. Reviewed resources in folder include this ONN's business card, [employer] [place] Eating Tips packet, [facility] [place] for Cancer Support Services Guide, [employer] flyer, [employer] flyer, and Advanced Directive class schedule and Advanced Directive packet. This ONN explained to patient that the providing of the Advanced Directive has nothing to do with patient's diagnosis or prognosis but that it is recommended that all patient's have one on file. Patient states she is aware of AD's and will look at information. Patient agrees she understands that this ONN is not providing AD based on patient's treatment or current health status. Patient denies other questions or concerns at this time. This ONN encouraged patient to call as needed.    This ONN then went and spoke with MATHEW Verdin who agreed to follow-up with patient in person today.     This ONN returned to patient and informed patient that MATHEW  Lambert would be by to see patient.

## 2019-08-14 NOTE — PROGRESS NOTES
Patient presents ambulatory for chemotherapy appt.  Patient reports feeling well and denies any s/s of infection at this time.  Labs from 8/13/19 reviewed, phone call to MD to report urine results, order received to hold avastin today, proceed with remaining chemotherapy, and for patient to have 24 hour urine test.  Test supplies obtained from lab alone with written instructions and given to patient.  Patient instructed on test and where to return sample, patient verbalized understanding.  Pre medications given with no adverse reaction.  Chemotherapy infused per MD order with no complications or adverse reactions.  Home infusion pump connected and running all clamps open.  Patient to return 8/16/19 for pump de access and 8/28/19 for chemotherapy, confirmed both with patient.  Patient left ambulatory in no distress.

## 2019-08-14 NOTE — PROGRESS NOTES
"Pharmacy Chemotherapy Verification  Patient Name: Mirna Black   Dx: Colorectal CA    Protocol: FOLFIRI + Bevacizumab  Irinotecan 180 mg/m2 IV over 30-90 minutes on Day 1 concurrent with   Leucovorin 400 mg/m2 IV over 30-90 minutes on Day 1 followed by   Fluorouracil 400 mg/m2 IV push on Day 1 followed by  Fluorouracil 1500 mg/m2 IV continuous infusion daily on Days 1-2 (2400 mg/m2 IV over 46-48 hours)  Bevacizumab 5 mg/kg IV on Day 1  14 day cycle until disease progression or unacceptable toxicity  NCCN Guidelines for Colon Cancer.V.2.2018  Matt V, et al. Lancet Oncol. 2014;15(10):1065-75  Abilio RHOADES, et al. J Clin Oncol.2007;25(30):6478-98    Allergies:  Pcn [penicillins]     /60   Pulse 78   Temp 36.7 °C (98.1 °F) (Temporal)   Resp 18   Ht 1.53 m (5' 0.24\")   Wt 47.7 kg (105 lb 2.6 oz)   SpO2 98%   BMI 20.38 kg/m²  Body surface area is 1.42 meters squared.    Labs from 8/13/19 reviewed - urine protein 2+, holding bevacizumab. All other results within treatment parameters.    Drug Order   (Drug name, dose, route, IV Fluid & volume, frequency, number of doses) Cycle: 9 (3rd cycle @ [facility])      Previous treatment: C8 on 7/31/19; Cycles 1-6 given at outside facility     Medication = Irinotecan  Base Dose = 180 mg/m2  Calc Dose: Base Dose x 1.42 m2 = 255.6 mg  Final Dose = 255.6 mg  Route = IV  Fluid & Volume = D5W 500 mL  Admin Duration = Over 120 minutes          <10% difference, OK to treat with final dose   Medication = Leucovorin   Base Dose = 400 mg/m2  Calc Dose: Base Dose x 1.42 m2 = 568 mg  Final Dose = 568 mg  Route = IV  Fluid & Volume = D5W 250 mL  Admin Duration = Over 120 minutes          <10% difference, OK to treat with final dose    Medication = Fluorouracil   Base Dose = 400 mg/m2   Calc Dose:Base Dose x 1.42 m2 = 568 mg  Final Dose = 570 mg  Route = IV  Fluid & Volume = 50 mg/mL= 11.4 mL  Admin Duration = Over 5 minutes          <10% difference, OK to treat with final dose  "   Medication = Fluorouracil   Base Dose= 2400 mg/m2  Calc Dose: Base Dose x 1.42 m2 = 3408 mg  Final Dose = 3410 mg  Route = IV  Fluid & Volume = 50 mg/mL; 68.2 mL (+ 3 mL overfill)  Admin Duration = Over 46 hours @ 1.5 mL/hr          <10% difference, OK to treat with final dose    Held for urine protein  By my signature below, I confirm this process was performed independently with the BSA and all final chemotherapy dosing calculations congruent. I have reviewed the above chemotherapy order and that my calculation of the final dose and BSA (when applicable) corroborate those calculations of the  pharmacist. Discrepancies of 10% or greater in the written dose have been addressed and documented within the EPIC Progress notes.    Daniel Cline, AngelineD

## 2019-08-14 NOTE — PROGRESS NOTES
Chemotherapy Verification - PRIMARY RN      Height = 60.24 in  Weight = 47.7 kg  BSA = 1.42 m2       Medication: Irinotecan  Dose: 180 mg/m2  Calculated Dose: 255.6 mg                             (In mg/m2, AUC, mg/kg)     Medication: 5 FU  Dose: 400 mg/m2  Calculated Dose: 568 mg                             (In mg/m2, AUC, mg/kg)    Medication: 5 FU pump  Dose: 2400 mg/m2 over 46 hours  Calculated Dose: 3408 mg over 46 hours                             (In mg/m2, AUC, mg/kg)    I confirm this process was performed independently with the BSA and all final chemotherapy dosing calculations congruent.  Any discrepancies of 10% or greater have been addressed with the chemotherapy pharmacist. The resolution of the discrepancy has been documented in the EPIC progress notes.

## 2019-08-16 NOTE — PROGRESS NOTES
Patient presents for home pump disconnect. Home pump reads volume 0. Home pump disconnected and stored in pharmacy. Port flushed per protocol and de-accessed, sterile gauze and paper tape to site. Patient scheduled for next appointment and released in no acute distress.

## 2019-08-29 NOTE — PROGRESS NOTES
Left message for pt to call scheduling to get appt for chemotherapy. Awaiting return call from pt to get her scheduled for 8/30 or 8/31.

## 2019-08-29 NOTE — PROGRESS NOTES
"Pt originally scheduled this AM for FOLFOX, no call/no show for appt. Received call from Dr. Brown stating she was \"sending the pt right over\" for hydration and anti-emetics. Pt's appt from this AM past. When asked, pt reported she had been double booked: her appt here was at 11:00 and her appt with Dr. Brown was at 10:45. Pt unable to receive hydration and anti-emetics at MD office due to insurance constraints. Pt with R chest port in place. Requesting lidocaine to numb site. Port site numbed and accessed in sterile field. Port flushed, brisk blood return observed, labs drawn as ordered. Hydration and anti-emetics infused per MAR. Labs reviewed and pt requiring potassium replacement. Electrolyte replacement protocol in place, 20 meq KCl ordered and infused per MAR. Pt slept through most of appt. All lines flushed clear at completion of infusions. Port flushed and blood return observed. Heparin instilled and needle removed. Gauze dressing applied. Encouraged pt to contact MD tomorrow to get rescheduled for chemo. Pt verbalized understanding. Pt discharged home under self care in no apparent distress. No future appts in place.   "

## 2019-09-02 NOTE — PROGRESS NOTES
"Pharmacy Chemotherapy Calculations:    Patient Name: Mirna Black  DX: Colon Cancer    Cycle 10 delayed   Previous treatment: C9 = 08/14/19    Protocol: FOLFiri + Bevacizumab  Irinotecan 180 mg/m2 IV on day 1  Leucovorin 400 mg/m2 iv on day 1 concurrently with irinotecan  5- mg/m2 iv bolus d1 followed by 2400 mg/m2 iv over 46 hrs  Bevacizumab 5 mg/kg IV on day 1   -08/14/19: HOLD Avastin Cycle 9 due to proteinuria per MD.    -09/02/19: Ok to resume Avastin Cycle 10 per Dr. Brwon   q14 days for 8-12 cycles  NCCN Guidelines for Colon Cancer V.2.2018  Matt V, Et al - Lancet Oncol. 2014 Sep;15(10):1066-75. Doi:  [id number]. Epub 2014 Jul 31.  Abilio RHOADES, et al - J Clin Oncol. 2007 Oct 20;25(30):2121-73.    Allergies: Food and Pcn [penicillins]    /58   Pulse 62   Temp 36.9 °C (98.4 °F) (Temporal)   Resp 18   Ht 1.53 m (5' 0.24\")   Wt 46.9 kg (103 lb 6.3 oz)   SpO2 100%   BMI 20.04 kg/m²  Body surface area is 1.41 meters squared.    Labs 09/01/19  ANC ~2400 Hgb = 11.8 Plt = 287k SCr = 0.56 mg/dL CrCl ~83 mL/min (min SCr 0.7 mg/dL)  AST/ALT/AP = 9/5/106 Tbili = 0.3     9/2/2019 10:25   POC Protein 30 (A) - ok to proceed with Avastin per Dr. Brown        Irinotecan (Camptosar) 180 mg/m2 x 1.41 m2 = 253.8 mg    <10% difference, ok to treat with final dose = 253.8 mg IV    Leucovorin 400 mg/m2 x 1.41 m2 = 564 mg    <10% difference, ok to treat with final dose = 564 mg IV    Fluorouracil (5-FU) 400 mg/m2 x 1.41 m2 = 564 mg    <10% difference, ok to treat with final dose = 565 mg IV    Fluorouracil (5-FU) 5-FU 2400 mg/m2 x 1.41 m2 = 3384 mg    <10% difference, ok to treat with final dose = 3385 mg    CIVI over 46 hours via CADD pump at 1.5 mL/hr  Bevacizumab (Avastin) 5 mg/kg x 46.9 kg = 234.5mg    Unable to round to vial size (> 10%) per dose rounding protocol.    OK to treat with final dose = 235 mg IV    Melissa Burgos, PharmD, BCOP        "

## 2019-09-02 NOTE — PROGRESS NOTES
Chemotherapy Verification - PRIMARY RN      Height = 153cm  Weight = 46.9kg  BSA = 1.41m2       Medication: Irinotecan (Camptosar) Dose: 180mg/m2  Calculated Dose: 253.8mg                                 Medication: Leucovorin  Dose: 400mg/m2  Calculated Dose: 564mg                             Medication: Fluorouracil (Adrucil)  Dose: 400mg/m2  Calculated Dose: 564mg                               Medication: Bevacizumab (Avastin)  Dose: 5mg/kg  Calculated Dose: 234.5mg                                Medication: Fluorouracil (Adrucil)  Dose: 2400mg/m2  Calculated Dose: 3384mg                                        I confirm this process was performed independently with the BSA and all final chemotherapy dosing calculations congruent.  Any discrepancies of 10% or greater have been addressed with the chemotherapy pharmacist. The resolution of the discrepancy has been documented in the EPIC progress notes.

## 2019-09-02 NOTE — PROGRESS NOTES
Pt arrived ambulatory to [facility] for chemo treatment. Pt had port accessed yesterday, 9/1/19 with blood draw. Port with brisk blood return, flushed with NS. Pt c/o nausea this am. Pt provided urine specimen, UA protein rechecked, result is 1+  Ok to treat with Avastin per Dr Brown. Pt with c/o continued nausea after premeds given, new orders recieved from Dr Brown for Aitvan. Pt medicated per MAR. Pt tolerated treatment without s/s adverse reaction. Pt states nausea resolved. Pt connected to CADD pump for home infusion. Pt reminded of next appt 9/4/19. Pt discharged to self care. Pt accompanied by daughter.

## 2019-09-02 NOTE — PROGRESS NOTES
Chemotherapy Verification - SECONDARY RN       Height = 60.24in  Weight = 46.9kg  BSA = 1.41m2       Medication: Irinotecan  Dose: 180mg/m2  Calculated Dose: 253.8mg                             (In mg/m2, AUC, mg/kg)     Medication: Leucovorin  Dose: 400mg/m2  Calculated Dose: 564mg                             (In mg/m2, AUC, mg/kg)    Medication: Fluorouracil  Dose: 400mg/m2  Calculated Dose: 564mg                             (In mg/m2, AUC, mg/kg)    Medication: Avastin  Dose: 5mg/kg  Calculated Dose: 234.5mg                             (In mg/m2, AUC, mg/kg)    Medication: Fluorouracil  Dose: 2400mg/m2  Calculated Dose: 3384mg via continuous pump over 46 hours                             (In mg/m2, AUC, mg/kg)      Urine protein 1+    I confirm that this process was performed independently.

## 2019-09-02 NOTE — PROGRESS NOTES
"Pharmacy Chemotherapy Verification  Patient Name: Mirna Black   Dx: Colorectal CA    Protocol: FOLFIRI + Bevacizumab  Irinotecan 180 mg/m2 IV over 30-90 minutes on Day 1 concurrent with   Leucovorin 400 mg/m2 IV over 30-90 minutes on Day 1 followed by   Fluorouracil 400 mg/m2 IV push on Day 1 followed by  Fluorouracil 1500 mg/m2 IV continuous infusion daily on Days 1-2 (2400 mg/m2 IV over 46-48 hours)  Bevacizumab 5 mg/kg IV on Day 1  14 day cycle until disease progression or unacceptable toxicity  NCCN Guidelines for Colon Cancer.V.2.2018  Matt V, et al. Lancet Oncol. 2014;15(10):1065-75  Abilio CS, et al. J Clin Oncol.2007;25(30):9294-74    Allergies:  Pcn [penicillins]     /58   Pulse 62   Temp 36.9 °C (98.4 °F) (Temporal)   Resp 18   Ht 1.53 m (5' 0.24\")   Wt 46.9 kg (103 lb 6.3 oz)   SpO2 100%   BMI 20.04 kg/m²  Body surface area is 1.41 meters squared.    Labs 9/1/19  ANC 2420 Hgb 11.8 Plt 287k  SCr 0.56 CrCl 54.5 mL/min   AST/ALT/AP = 9/5/106 Tbili = 0.3     9/1/2019 14:30   POC Protein 100 (A)      9/2/2019 10:25   POC Protein 30 (A)     Drug Order   (Drug name, dose, route, IV Fluid & volume, frequency, number of doses) Cycle: 10 (4th cycle @ [facility]) - delayed    Previous treatment: C9 on 8/14/19; Cycles 1-6 given at outside facility     Medication = Irinotecan  Base Dose = 180 mg/m2  Calc Dose: Base Dose x 1.41 m2 = 253.8 mg  Final Dose = 253.8 mg  Route = IV  Fluid & Volume = D5W 500 mL  Admin Duration = Over 120 minutes          <10% difference, OK to treat with final dose   Medication = Leucovorin   Base Dose = 400 mg/m2  Calc Dose: Base Dose x 1.41 m2 = 564 mg  Final Dose = 564 mg  Route = IV  Fluid & Volume = D5W 250 mL  Admin Duration = Over 90 minutes          <10% difference, OK to treat with final dose    Medication = Fluorouracil   Base Dose = 400 mg/m2   Calc Dose:Base Dose x 1.42 m2 = 564 mg  Final Dose = 565 mg  Route = IV  Fluid & Volume = 50 mg/mL= 11.3 mL  Admin " Duration = Over 5 minutes          <10% difference, OK to treat with final dose    Medication = Fluorouracil   Base Dose = 2400 mg/m2  Calc Dose: Base Dose x 1.41 m2 = 3384 mg  Final Dose = 3385 mg  Route = IV  Fluid & Volume = 50 mg/mL; 67.7 mL (+ 3 mL overfill)  Admin Duration = Over 46 hours @ 1.5 mL/hr          <10% difference, OK to treat with final dose    Medication = Bevacizumab  Base Dose = 5 mg/kg  Calc Dose: Base Dose x 46.9 kg = 234.5 mg  Final Dose = 235 mg  Route = IV  Fluid & Volume =  mL  Admin Duration = Over 30 minutes       <10% difference, OK to treat with final dose      By my signature below, I confirm this process was performed independently with the BSA and all final chemotherapy dosing calculations congruent. I have reviewed the above chemotherapy order and that my calculation of the final dose and BSA (when applicable) corroborate those calculations of the  pharmacist. Discrepancies of 10% or greater in the written dose have been addressed and documented within the EPIC Progress notes.    Alba West, PharmD, BCOP

## 2019-09-05 NOTE — PROGRESS NOTES
Pt returns to \[facility]\"" for 5FU CADD pump disconnect.  Pt voices no new complaints.  5FU pump volume is complete.  R chest port flushed with NS and heparin per protocol.  Velasquez needle removed intact and site covered with gauze.  Spoke to scheduling dept to schedule out future cycles.  Gave a copy of schedule to patient. Pt dc home to self care.

## 2019-09-13 PROBLEM — E87.6 HYPOKALEMIA: Status: ACTIVE | Noted: 2019-01-01

## 2019-09-13 PROBLEM — N17.9 AKI (ACUTE KIDNEY INJURY) (HCC): Status: ACTIVE | Noted: 2019-01-01

## 2019-09-13 PROBLEM — R19.7 NAUSEA VOMITING AND DIARRHEA: Status: ACTIVE | Noted: 2019-01-01

## 2019-09-13 PROBLEM — E87.29 HIGH ANION GAP METABOLIC ACIDOSIS: Status: ACTIVE | Noted: 2019-01-01

## 2019-09-13 PROBLEM — E87.1 HYPONATREMIA: Status: ACTIVE | Noted: 2019-01-01

## 2019-09-13 PROBLEM — R11.2 NAUSEA VOMITING AND DIARRHEA: Status: ACTIVE | Noted: 2019-01-01

## 2019-09-13 NOTE — ED TRIAGE NOTES
Pt to triage , pt is currently undergoing chemo for colon cancer , c/o nausea/vomiting/ diarrhea for over 3 days . Unable to take medications. Denies fevers

## 2019-09-13 NOTE — ED NOTES
Break RN Note**  Assessment complete.  Pt has family bedside.  Pt asking port to R chest wall be accessed rather than PIV and also requesting to speak to ERP about numbing creme prior to port access.  ERP bedside.

## 2019-09-13 NOTE — ED PROVIDER NOTES
ED Provider Note     Scribed for Radha Bradshaw D.O. by Martina Sheffield. 9/13/2019, 4:15 PM.     Primary care provider: None  Means of arrival: Walk in         History obtained from: Patient  History limited by: None    CHIEF COMPLAINT  Chief Complaint   Patient presents with   • Nausea/Vomiting/Diarrhea       HPI  Mirna Black is a 69 y.o. female with a history of hypertension and Stage IV metastatic colon cancer who presents to the Emergency Department for evaluation of nausea, vomiting, and diarrhea onset 5 days ago. Daughter states she has had recent chemotherapy for metastatic colon cancer and notes patient she has recently lost 14 pounds in weight. The patient denies any associated fever, dysuria, hematemesis or bloody stools. She denies taking any antibiotics. Patient denies any recent long travels. She reports she is allergic to walnuts and penicillin.     REVIEW OF SYSTEMS  Pertinent positives include nausea, vomiting, diarrhea. Pertinent negatives include no fever, dysuria, hematemesis, abdominal pain. or bloody stools..   See HPI for further details. All other systems are negative.    PAST MEDICAL HISTORY  Past Medical History:   Diagnosis Date   • Colon cancer metastasized to intra-abdominal lymph node (HCC) 7/2/2019   • Hypertension        FAMILY HISTORY  Family History   Problem Relation Age of Onset   • Heart Disease Father         AAA   • Heart Disease Mother    • Stroke Sister    • Heart Disease Brother         bi-pass       SOCIAL HISTORY  Social History     Tobacco Use   • Smoking status: Former Smoker     Types: Cigarettes   • Tobacco comment: 5  years ago   Substance Use Topics   • Alcohol use: Yes     Comment: socially   • Drug use: No      Social History     Substance and Sexual Activity   Drug Use No       SURGICAL HISTORY  Past Surgical History:   Procedure Laterality Date   • APPENDECTOMY     • CHOLECYSTECTOMY     • OTHER     • TONSILLECTOMY     • TUMOR EXCISION WITH BIOPSY  wrapped  around carotid in brain       CURRENT MEDICATIONS  Current Outpatient Medications:   •  mirtazapine (REMERON) 30 MG Tab tablet, Take 30 mg by mouth every evening., Disp: , Rfl:   •  Morphine Sulfate ER 15 MG Tablet Extended Release Abuse-Deterrent, Take 15 mg by mouth 3 times a day as needed., Disp: , Rfl:   •  HYDROcodone-acetaminophen (NORCO) 7.5-325 MG per tablet, Take 1-2 Tabs by mouth every 8 hours as needed., Disp: , Rfl:   •  ondansetron (ZOFRAN ODT) 8 MG TABLET DISPERSIBLE, Take 8 mg by mouth every 6 hours as needed for Nausea., Disp: , Rfl:   •  vitamin D (CHOLECALCIFEROL) 1000 UNIT Tab, Take 2,000 Units by mouth every 7 days., Disp: , Rfl:   •  polyethylene glycol/lytes (MIRALAX) Pack, Take 17 g by mouth every 24 hours as needed., Disp: , Rfl:     Facility-Administered Medications Ordered in Other Encounters:   •  heparin pf injection 500 Units, 500 Units, Intracatheter, PRN, Madeleine Brown M.D.  •  normal saline PF 0.9 % 5 mL, 5 mL, Intravenous, PRN, Madeleine Brown M.D.  •  normal saline PF 0.9 %, , Intravenous, PRN, Madeleine Brown M.D.  •  normal saline PF 0.9 %, , Intravenous, PRN, Madeleine Brown M.D.  •  normal saline PF 0.9 %, , Intracatheter, PRN, Madeleine Brown M.D.    ALLERGIES  Allergies   Allergen Reactions   • Food Shortness of Breath and Swelling     Walnuts     • Pcn [Penicillins] Anaphylaxis and Swelling       PHYSICAL EXAM  VITAL SIGNS: /92   Pulse 100   Temp 36.7 °C (98.1 °F) (Temporal)   Resp 18   Ht 1.524 m (5')   Wt 39.7 kg (87 lb 8.4 oz)   SpO2 98%   BMI 17.09 kg/m²     Constitutional: Patient is a thin, cachetic, elderly female in mild distress.  Non-toxic appearing.   HENT: Normocephalic, atraumatic. Bilateral external auditory canals normal. Nose normal. Oropharynx very dry.   Eyes: PERRL, EOMI, Conjunctiva without erythema or exudates.   Neck: Supple. Normal range of motion in flexion, extension and lateral rotation. No tenderness.   Lymphatic: No  lymphadenopathy noted.   Cardiovascular: Tachycardic heart rate and Regular rhythm. No murmur.  Thorax & Lungs: Clear and equal breath sounds with good excursion. No respiratory distress, no rhonchi, wheezing or rales.  Abdomen: Thin, with palpable mass in her left to mid quadrant that is tender to the touch. Bowel sounds normal in all four quadrants.  Skin: Pale, cool and, Dry,  No rashes. Increased skin turgor.   Extremities: Peripheral pulses 4/4 No edema, No tenderness.  Musculoskeletal: Normal range of motion in all major joints. No tenderness to palpation or major deformities noted.   Neurologic: Alert & oriented x 3, Normal motor function, Normal sensory function  Psychiatric: Affect normal, Judgment normal, Mood tearful.       DIAGNOSTICS/PROCEDURES    LABS     Results for orders placed or performed during the hospital encounter of 09/13/19   CBC WITH DIFFERENTIAL   Result Value Ref Range    WBC 10.1 4.8 - 10.8 K/uL    RBC 5.01 4.20 - 5.40 M/uL    Hemoglobin 14.5 12.0 - 16.0 g/dL    Hematocrit 43.9 37.0 - 47.0 %    MCV 87.6 81.4 - 97.8 fL    MCH 28.9 27.0 - 33.0 pg    MCHC 33.0 (L) 33.6 - 35.0 g/dL    RDW 55.1 (H) 35.9 - 50.0 fL    Platelet Count 283 164 - 446 K/uL    MPV 9.1 9.0 - 12.9 fL    Neutrophils-Polys 73.80 (H) 44.00 - 72.00 %    Lymphocytes 20.30 (L) 22.00 - 41.00 %    Monocytes 4.80 0.00 - 13.40 %    Eosinophils 0.10 0.00 - 6.90 %    Basophils 0.60 0.00 - 1.80 %    Immature Granulocytes 0.40 0.00 - 0.90 %    Nucleated RBC 0.00 /100 WBC    Neutrophils (Absolute) 7.44 (H) 2.00 - 7.15 K/uL    Lymphs (Absolute) 2.05 1.00 - 4.80 K/uL    Monos (Absolute) 0.48 0.00 - 0.85 K/uL    Eos (Absolute) 0.01 0.00 - 0.51 K/uL    Baso (Absolute) 0.06 0.00 - 0.12 K/uL    Immature Granulocytes (abs) 0.04 0.00 - 0.11 K/uL    NRBC (Absolute) 0.00 K/uL   COMP METABOLIC PANEL   Result Value Ref Range    Sodium 131 (L) 135 - 145 mmol/L    Potassium 3.3 (L) 3.6 - 5.5 mmol/L    Chloride 94 (L) 96 - 112 mmol/L    Co2 16 (L)  20 - 33 mmol/L    Anion Gap 21.0 (H) 0.0 - 11.9    Glucose 107 (H) 65 - 99 mg/dL    Bun 77 (HH) 8 - 22 mg/dL    Creatinine 1.61 (H) 0.50 - 1.40 mg/dL    Calcium 9.7 8.5 - 10.5 mg/dL    AST(SGOT) 17 12 - 45 U/L    ALT(SGPT) 11 2 - 50 U/L    Alkaline Phosphatase 159 (H) 30 - 99 U/L    Total Bilirubin 0.6 0.1 - 1.5 mg/dL    Albumin 4.5 3.2 - 4.9 g/dL    Total Protein 7.5 6.0 - 8.2 g/dL    Globulin 3.0 1.9 - 3.5 g/dL    A-G Ratio 1.5 g/dL   LIPASE   Result Value Ref Range    Lipase 20 11 - 82 U/L   ESTIMATED GFR   Result Value Ref Range    GFR If  38 (A) >60 mL/min/1.73 m 2    GFR If Non  32 (A) >60 mL/min/1.73 m 2       Labs reviewed by me    COURSE & MEDICAL DECISION MAKING  Pertinent Labs & Imaging studies reviewed. (See chart for details)    4:15 PM - Patient seen and evaluated at bedside. Ordered for CBC with diff, CMP, Lipase, UA to evaluate. Patient will be treated with Glydo 2% jelly, Morphine 4 mg, Zofran 4 mg and NS infusion 2 L for her symptoms. Differential diagnoses include, but are not limited to, dehydration, electrolyte abnormality, gastroenteritis  Patient responded well to the IV fluids.  Her laboratories revealed a sodium of 131 with a potassium of 3.3 and a BUN of 75.  Her creatinine is 1.61  Patient continues to be extremely nauseated with vomiting dry heaves, I will attempt to give her Compazine IV push and plan will be to admit her into the hospital secondary to dehydration and electrolyte abnormalities.  I spoke with Dr. Lewis and he is accepted her for admission.  She will be transferred to the floor in guarded condition.    HYDRATION: Based on the patient's presentation of Dehydration the patient was given IV fluids. IV Hydration was used because oral hydration was not adequate alone. Upon recheck following hydration, the patient was mildy improved.      FINAL IMPRESSION  Nausea with vomiting  Diarrhea  Dehydration  Stage IV metastatic colon cancer     Martina ASHLEY  LORI Sheffield (Scribe), am scribing for, and in the presence of, Radha Bradshaw D.O..    Electronically signed by: Martina Sheffield (Keoibchristian), 9/13/2019    I, Radha Bradshaw D.O. personally performed the services described in this documentation, as scribed by Martina Sheffield in my presence, and it is both accurate and complete. C    The note accurately reflects work and decisions made by me.  Radha Bradshaw  9/13/2019  7:49 PM

## 2019-09-14 PROBLEM — R19.02 ABDOMINAL MASS, LUQ (LEFT UPPER QUADRANT): Status: ACTIVE | Noted: 2019-01-01

## 2019-09-14 PROBLEM — Z66 DNR (DO NOT RESUSCITATE): Status: ACTIVE | Noted: 2019-01-01

## 2019-09-14 PROBLEM — D64.9 ANEMIA: Status: ACTIVE | Noted: 2019-01-01

## 2019-09-14 NOTE — PROGRESS NOTES
· 2 RN skin check complete with Radha.   · Devices in place SCDs, RT chest port,  probe.  · Skin assessed under devices yes/intact.  · Confirmed pressure ulcers found on NA.  · The following interventions are in place hospital mattress, encourage frequent ambulation, skin moisturizer.  Elbows red and blanching, otherwise skin intact

## 2019-09-14 NOTE — ASSESSMENT & PLAN NOTE
Multifactorial  Likely from chemotherapy inuced myelosuppression and colon/abdominal bleeding  Daily monitoring   SCDs  Transfuse Hgb <7

## 2019-09-14 NOTE — PROGRESS NOTES
Received from green pod, aox4,  unsteady on her  feet. With  sharp, gnawing abdominal  pain 5/10. Call light within reach. Needs attended. Plan of care discussed and understood.

## 2019-09-14 NOTE — PROGRESS NOTES
Denies nausea, requested for some crackers and orange juice, tolerated the snacks, to continue to monitor.

## 2019-09-14 NOTE — CONSULTS
Reason for PC Consult: Advance Care Planning    Consulted by: Dr. Sylvester    Assessment:  General: 69yoF admitted 9/13/2019 c/o intractable nausea, vomiting, diarrhea in the setting of nonoperable metastatic recurrent colon cancer. Pt was admitted for HOalbuminemia, Intractable N/V, and Metabolic Acidosis. Pt is undergoing chemo with Dr. Brown. Pt has lost 16lbs in 5 weeks. Pt's c/o tenacious secretions in her throat that can be thinned by drinking water, but the water makes her vomit. She c/o LUQ mass and experiences pain. Last dose of chemo 09/04/2019.  PMHx:Stage 3 Colon cancer mets to intra-abdominal lymph node, MUNIR, HTN, appy, lisa, tumor excision wrapped around carotid-brain       Dyspnea: No 98% RA  Last BM: 09/13/19    Pain: Yes- 6/10, PRN ineffective, BS RN notified  Depression: Mood appropriate for situation    Dementia: No;       Spiritual:  Is Evangelical or spirituality important for coping with this illness? Yes    Has a  or spiritual provider visit been requested? No    Palliative Performance Scale: 50%    Advance Directive: None- Discussed at this encounter  DPOA: No-    POLST: No- Discussed at this encounter    Code Status: DNR- DNAR/DNI    Social:   Pt has 2 dtrs, Shannan and Bambi. She has good family support. She lives in [place].    Outcome:  Introduced self and role of Palliative Care.  Assessed pt's understanding of current medical status, overall health picture, and options for future care. Pt verbalized medication she is receiving to treat her symptoms, but medications barely control nausea. C/o pain 6/10 LUQ, where the mass is located. Pt is well versed in her overall health picture. She understands her prognosis ans surgical intervention is not an option.     Explored pt's values, beliefs, and preferences in order to identify GOC. Pt has plans to go to [place] in October. She changed her code status to DNAR/DNI so a POLST was completed and signed. Pt named her Shannan and Bambi as her  healthcare agents. Hospice was briefly discussed d/t her pain increasing. PC RN alerted MD and BS RN.    Active listening, reflection, reminiscing, validation & normalization, empathic support and therapeutic touch utilized throughout this encounter.  All questions answered.  PC contact information given.       Discussed with/Updated: Dr. Sylvester, BS RN    Plan: Discuss hospice. Palliative care to continue to follow, provide support, and help facilitate decision making as clinical picture evolves.         Thank you for allowing Palliative Care to participate in this patient's care. Please feel free to call [phone] with any questions or concerns.

## 2019-09-14 NOTE — ED NOTES
Port accessed. Local anasthetic used. Pt tolerated well. Unable to draw blood at this time. To reassess s/p IVF.

## 2019-09-14 NOTE — ASSESSMENT & PLAN NOTE
On replacement  Continue to monitor  9/18: Still not corrected. Continue supplements. Will check Mg levels.

## 2019-09-14 NOTE — ASSESSMENT & PLAN NOTE
Likely chemo induced  Continue with fluid resuscitation along with as needed medications for nausea and vomiting.  PRN diarrhea medications as well.  9/19: Advance diet as tolerated.  Still having loose stools.  Refused Imodium this morning but then agreed to try it.

## 2019-09-14 NOTE — PROGRESS NOTES
Hospital Medicine Daily Progress Note    Date of Service  9/14/2019    Chief Complaint  69 y.o. female admitted 9/13/2019 with intractable nausea, vomiting, diarrhea in the setting of nonoperable metastatic recurrent colon cancer    Hospital Course    Patient is a pleasant 69-year-old female who presented with intractable nausea vomiting and diarrhea with a reported 16 pound weight loss over the last 5 weeks.  She has recently started chemotherapy for her colon cancer and last received a dose 8 days prior to presentation.  She describes mucus in her throat that she has difficulty clearing and when she drinks water this thins the secretions and stomach fluids such that she develops projectile vomiting.  She has ongoing stomach pain from metastatic masses off of the stomach.  She follows with Dr. Brown.    Interval Problem Update  Patient reports feeling improved, with less dizziness but with ongoing left upper quadrant abdominal pain, mucus in her throat, and intermittent retching.  Normocytic anemia with 4 g drop overnight-- significantly worsening  Metabolic acidosis with bicarb unchanged at 16  Anion gap closed  Acute kidney injury improving with IV fluids and serum creatinine now 0.9  Significant hypoalbuminemia  Patient cachectic and currently listed as full code      Consultants/Specialty  Palliative    Code Status  Changed 9/14/2019 to DNR/DNI    Disposition  To be determined    Review of Systems  Review of Systems   Constitutional: Positive for malaise/fatigue and weight loss. Negative for chills, diaphoresis and fever.   Eyes: Negative for blurred vision, double vision and photophobia.   Respiratory: Negative for cough, hemoptysis, sputum production, shortness of breath and wheezing.    Cardiovascular: Negative for chest pain, palpitations, orthopnea, claudication, leg swelling and PND.   Gastrointestinal: Positive for abdominal pain, nausea and vomiting. Negative for constipation, diarrhea and heartburn.    Genitourinary: Negative for dysuria, frequency, hematuria and urgency.   Skin: Negative for rash.   Neurological: Positive for dizziness. Negative for tingling, sensory change, focal weakness, seizures and headaches.   All other systems reviewed and are negative.       Physical Exam  Temp:  [36 °C (96.8 °F)-36.9 °C (98.5 °F)] 36 °C (96.8 °F)  Pulse:  [] 71  Resp:  [16-20] 16  BP: (101-185)/(58-92) 103/58  SpO2:  [95 %-99 %] 98 %    Physical Exam   Constitutional: She is oriented to person, place, and time. Vital signs are normal. She appears well-developed. She appears cachectic. No distress.   HENT:   Head: Normocephalic and atraumatic.   Eyes: Pupils are equal, round, and reactive to light. EOM are normal.   Neck: Neck supple.   Cardiovascular: Normal rate and regular rhythm. Exam reveals no gallop, no S3 and no S4.   Murmur heard.   Systolic murmur is present with a grade of 1/6.  Pulmonary/Chest: Effort normal and breath sounds normal. No respiratory distress. She has no wheezes. She has no rales.   Abdominal: Soft. She exhibits mass. She exhibits no distension and no ascites. Bowel sounds are increased. There is tenderness in the epigastric area and left upper quadrant. There is rebound.   Neurological: She is alert and oriented to person, place, and time. No cranial nerve deficit.   Skin: Skin is warm and dry.       Fluids  No intake or output data in the 24 hours ending 09/14/19 0926    Laboratory  Recent Labs     09/13/19 1808 09/14/19 0438   WBC 10.1 7.8   RBC 5.01 3.50*   HEMOGLOBIN 14.5 10.2*   HEMATOCRIT 43.9 31.1*   MCV 87.6 88.9   MCH 28.9 29.1   MCHC 33.0* 32.8*   RDW 55.1* 55.8*   PLATELETCT 283 198   MPV 9.1 9.1     Recent Labs     09/13/19 1808 09/14/19 0438   SODIUM 131* 135   POTASSIUM 3.3* 3.6   CHLORIDE 94* 108   CO2 16* 16*   GLUCOSE 107* 63*   BUN 77* 53*   CREATININE 1.61* 0.98   CALCIUM 9.7 7.6*                   Imaging  No orders to display        Assessment/Plan  * Nausea  vomiting and diarrhea  Assessment & Plan  No evidence of acute infectious source this is likely related to patients chemotherapy   Regardless will check stool wbc's for evaluation   Rehydrate with IVF, anti emetics   Advance diet astolerated    Abdominal mass, LUQ (left upper quadrant)  Assessment & Plan  Known metastatic mass to the stomach  No imaging on file with [facility]  May need to obtain outside imaging report/records  PPI    Anemia  Assessment & Plan  Multifactorial  Likely from chemotherapy inuced myelosuppression and colon/abdominal bleeding  Daily monitoring  Stop Heparin --- SCDs  Transfuse Hgb <7    DNR (do not resuscitate)  Assessment & Plan  I had a lengthy discussion regarding the patient's CODE STATUS.  She was admitted as a full code.  After further discussion I do not believe should her condition progress to cardiopulmonary arrest or acute respiratory failure that her prognosis would be improved with intubation or CPR.  Patient agreed this was unlikely to add further length or quality to her life, and thus has elected DNR/DNI.  Palliative care consult to assist with POLST form and DPOA designation    High anion gap metabolic acidosis  Assessment & Plan  This is likely due to severe dehydration, and possibly related to loss of bicarb from diarrhea   Check lactic, beta hydroxy   Aggressive IVF and recheck labs in am     Hypokalemia- (present on admission)  Assessment & Plan  Replace with 40 oral Kdur  Follow potassium daily    MUNIR (acute kidney injury) (HCC)- (present on admission)  Assessment & Plan  This is prerenal in etiology due to Gi losses, N/V/D  Cont with aggressive IVF   Monitor I/o strictly   Avoid nephrotoxic medications   Recheck renal function in am     Hyponatremia  Assessment & Plan  This Is hypovolemic   Cont with IVF and recheck labs in am     Colon cancer metastasized to intra-abdominal lymph node (HCC)- (present on admission)  Assessment & Plan  History of   with current scheduled  chemotherapy, last chemo 9/2/2019       VTE prophylaxis: SCDs

## 2019-09-14 NOTE — H&P
Hospital Medicine History & Physical Note    Date of Service  9/13/2019    Primary Care Physician  Pcp Unknown    Consultants  none    Code Status  full    Chief Complaint  N/v/diarrhea     History of Presenting Illness  69 y.o. female who presented 9/13/2019 with past medical history of colon cancer with known metastasis who presents with intractable nausea vomiting diarrhea.  This patient symptoms started about 5 days ago.  She is currently receiving chemotherapy for metastatic colon cancer last dose of chemotherapy was about a week ago.  She has scheduled chemotherapy next week.  She has been having nausea vomiting and diarrhea for the last 4 days that is now responsive to her home medications.  She is also had some mild abdominal cramping.  No fever no chills no sweats.  No recent antibiotic use.  In the emergency department she is found to be severely dehydrated and will be admitted to the hospital for further management of her symptoms.    Review of Systems  Review of Systems   Constitutional: Positive for malaise/fatigue and weight loss. Negative for chills and fever.   HENT: Negative for congestion, hearing loss and tinnitus.    Eyes: Negative for blurred vision, double vision and discharge.   Respiratory: Negative for cough, hemoptysis and shortness of breath.    Cardiovascular: Negative for chest pain, palpitations and leg swelling.   Gastrointestinal: Positive for abdominal pain, diarrhea, nausea and vomiting. Negative for heartburn.   Genitourinary: Negative for dysuria and flank pain.   Musculoskeletal: Negative for joint pain and myalgias.   Skin: Negative for rash.   Neurological: Negative for dizziness, sensory change, speech change, focal weakness and weakness.   Endo/Heme/Allergies: Negative for environmental allergies. Does not bruise/bleed easily.   Psychiatric/Behavioral: Negative for depression, hallucinations and substance abuse.       Past Medical History   has a past medical history of Colon  cancer metastasized to intra-abdominal lymph node (HCC) (7/2/2019) and Hypertension. She also has no past medical history of Angina, Arrhythmia, Arthritis, ASTHMA, Backpain, Bronchitis, CATARACT, Congestive heart failure (HCC), COPD, Diabetes, Dialysis, Glaucoma, Heart murmur, Heart valve disease, Indigestion, Infectious disease, Jaundice, Myocardial infarct (HCC), Other specified symptom associated with female genital organs, Pacemaker, Personal history of venous thrombosis and embolism, Pneumonia, Psychiatric problem, Renal disorder, Rheumatic fever, Seizure (HCC), Stroke (HCC), Unspecified disorder of thyroid, Unspecified hemorrhagic conditions, or Unspecified urinary incontinence.    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     Family History  family history includes Heart Disease in her brother, father, and mother; Stroke in her sister.     Social History   reports that she has quit smoking. Her smoking use included cigarettes. She does not have any smokeless tobacco history on file. She reports that she drinks alcohol. She reports that she does not use drugs.    Allergies  Allergies   Allergen Reactions   • Food Shortness of Breath and Swelling     Walnuts     • Pcn [Penicillins] Anaphylaxis and Swelling       Medications  Prior to Admission Medications   Prescriptions Last Dose Informant Patient Reported? Taking?   HYDROcodone-acetaminophen (NORCO) 7.5-325 MG per tablet   Yes No   Sig: Take 1-2 Tabs by mouth every 8 hours as needed.   Morphine Sulfate ER 15 MG Tablet Extended Release Abuse-Deterrent   Yes No   Sig: Take 15 mg by mouth 3 times a day as needed.   mirtazapine (REMERON) 30 MG Tab tablet   Yes No   Sig: Take 30 mg by mouth every evening.   ondansetron (ZOFRAN ODT) 8 MG TABLET DISPERSIBLE   Yes No   Sig: Take 8 mg by mouth every 6 hours as needed for Nausea.    polyethylene glycol/lytes (MIRALAX) Pack   Yes No   Sig: Take 17 g by mouth every 24 hours as needed.   vitamin D (CHOLECALCIFEROL) 1000 UNIT Tab   Yes No   Sig: Take 2,000 Units by mouth every 7 days.      Facility-Administered Medications: None       Physical Exam  Temp:  [36.7 °C (98.1 °F)] 36.7 °C (98.1 °F)  Pulse:  [] 88  Resp:  [18] 18  BP: (123-133)/(69-92) 123/69  SpO2:  [95 %-98 %] 95 %    Physical Exam   Constitutional: She is oriented to person, place, and time. She appears well-developed and well-nourished. She appears distressed.   HENT:   Head: Normocephalic and atraumatic.   Dry oral mucosa severe    Eyes: Pupils are equal, round, and reactive to light. Conjunctivae and EOM are normal.   Neck: Normal range of motion. Neck supple. No JVD present.   Cardiovascular: Normal rate, regular rhythm, normal heart sounds and intact distal pulses.   No murmur heard.  Pulmonary/Chest: Effort normal and breath sounds normal. No respiratory distress. She has no wheezes.   Abdominal: Soft. Bowel sounds are normal. She exhibits no distension. There is tenderness.   Mild ttp    Musculoskeletal: Normal range of motion. She exhibits no edema.   Neurological: She is alert and oriented to person, place, and time. She exhibits normal muscle tone.   Skin: Skin is warm and dry.   Psychiatric: She has a normal mood and affect. Her behavior is normal. Judgment and thought content normal.   Nursing note and vitals reviewed.      Laboratory:  Recent Labs     09/13/19  1808   WBC 10.1   RBC 5.01   HEMOGLOBIN 14.5   HEMATOCRIT 43.9   MCV 87.6   MCH 28.9   MCHC 33.0*   RDW 55.1*   PLATELETCT 283   MPV 9.1     Recent Labs     09/13/19  1808   SODIUM 131*   POTASSIUM 3.3*   CHLORIDE 94*   CO2 16*   GLUCOSE 107*   BUN 77*   CREATININE 1.61*   CALCIUM 9.7     Recent Labs     09/13/19  1808   ALTSGPT 11   ASTSGOT 17   ALKPHOSPHAT 159*   TBILIRUBIN 0.6   LIPASE 20   GLUCOSE 107*         No results for input(s): NTPROBNP in the  last 72 hours.      No results for input(s): TROPONINT in the last 72 hours.    Urinalysis:    No results found     Imaging:  No orders to display         Assessment/Plan:  I anticipate this patient is appropriate for observation status at this time.    * Nausea vomiting and diarrhea  Assessment & Plan  No evidence of acute infectious source this is likely related to patients chemotherapy   Regardless will check stool wbc's for evaluation   Rehydrate with IVF, anti emetics   Advance diet astolerated    High anion gap metabolic acidosis  Assessment & Plan  This is likely due to severe dehydration, and possibly related to loss of bicarb from diarrhea   Check lactic, beta hydroxy   Aggressive IVF and recheck labs in am     Hypokalemia  Assessment & Plan  Replace with 40 oral Kdur, recheck labs in am     MUNIR (acute kidney injury) (HCC)  Assessment & Plan  This is prerenal in etiology due to Gi losses, N/V/D  Cont with aggressive IVF   Monitor I/o strictly   Avoid nephrotoxic medications   Recheck renal function in am     Hyponatremia  Assessment & Plan  This Is hypovolemic   Cont with IVF and recheck labs in am     Colon cancer metastasized to intra-abdominal lymph node (HCC)- (present on admission)  Assessment & Plan  History of   with current scheduled chemotherapy, last chemo 9/2/2019      VTE prophylaxis: heparin

## 2019-09-14 NOTE — DIETARY
"Nutrition services: Mirna Black is a 69 y.o. female with admitting DX of intractable N/V, MUNIR, metabolic acidosis, hypoalbuminemia.      Consult received for \"massive weight loss\", pt also with BMI <19 (17.91).      Met with pt and daughter.  Pt states that 5 weeks ago she was 50 kg. She started chemo for stage 3 colon cancer with metastatic mass in the stomach.  She has been unable to eat very much for the past 5 weeks and 5 days ago started with N/V. Liquids do not feel better than solids in her stomach.    Pt has had extensive loss of LBM stores, says \"I have lost it all\".    Discussed food options but she really does not think she will be able to eat.  Palliative care saw pt; hospice to be discussed.    Pt says that everything tastes too sweet, so not sure she will tolerate supplements.    Brought her some Boost Breeze to try as the Boost Plus \"tastes milky, which makes the mucous/phlegm in the back of my throat worse\".  Also brought a Boost Plus in case she wants to try.   Pt was very appreciative of visit.      Assessment:  Height: 152.4 cm (5')  Weight: 41.6 kg (91 lb 11.4 oz)  Body mass index is 17.91 kg/m².  Pertinent medical history: stage 3 colon CA with mets  Diet/Intake: regular with minimal intake; drinks water to thin the secretions in her throat but then vomits    Evaluation:   1. Pt with severe chronic disease related malnutrition as evidenced by 21% wt loss in 5 weeks r/t reduced po intake (<50% of normal) with minimal intake the past 5 days and diffuse muscle/fat wasting  2. Pt would be at high risk for refeeding syndrome, but do not think she is going to eat enough to refeed.  Also, IVF is with NS and not D5.  Mg was WNL yesterday but no phos level checked.    3. Would recommend consider nutrition support but pt states would decline; knows her CA is inoperable  4. Last BM: PTA?  5. Pertinent meds/fluids: regradha rushingfran - pt says these meds are not helping "     Recommendations/Plan:  1. Continue with regular diet as tolerated, order supplements per pt preference if she desires them   2. Document intake of all meals/snacks as % taken in ADL's to provide interdisciplinary communication across all shifts.   3. Consider adding daily mg, phos to BMP/CMP to monitor for refeeding syndrome if pt is eating >25% meals.    4. Monitor weight.  5. Nutrition rep will continue to see patient for ongoing meal and snack preferences.   6. RD following.

## 2019-09-14 NOTE — PROGRESS NOTES
Assumed care of patient after pt arrived to medical floor from CDU, report received. This RN agrees with previous assessment performed on pt.

## 2019-09-14 NOTE — ED NOTES
Pt educated on need to provide urine sample, states inability to do so at this time-- to notify RN when able

## 2019-09-15 NOTE — PROGRESS NOTES
Report received on this patient. She is alert and oriented times 4. Patient has complaints of nausea. Zofran administered. No other needs at this time.

## 2019-09-15 NOTE — CARE PLAN
Problem: Safety  Goal: Will remain free from injury  Outcome: PROGRESSING AS EXPECTED     Problem: Communication  Goal: The ability to communicate needs accurately and effectively will improve  Outcome: PROGRESSING AS EXPECTED

## 2019-09-15 NOTE — PALLIATIVE CARE
Palliative Care follow-up  POLST signed on chart. Pt c/o abdominal pain 7/10. BS RN notified        Plan: Discuss hospice. Palliative care to continue to follow, provide support, and help facilitate decision making as clinical picture evolves.      Active listening, education, validation, normalization, therapeutic touch, and emotional support provided throughout encounter    Thank you for allowing Palliative Care to participate in this patient's care. Please feel free to call [phone] with any questions or concerns.

## 2019-09-15 NOTE — CARE PLAN
Problem: Pain Management  Goal: Pain level will decrease to patient's comfort goal  Outcome: PROGRESSING AS EXPECTED  Flowsheets (Taken 9/15/2019 1035)  Pain Rating Scale (NPRS): 7  Note:   Pt has PRN medication for pain and calls appropriately for it; also offered heat packs for alternative pain control.      Problem: Bowel/Gastric:  Goal: Normal bowel function is maintained or improved  Outcome: PROGRESSING SLOWER THAN EXPECTED  Note:   Pt continuing to have loose watery bm's.

## 2019-09-16 NOTE — PROGRESS NOTES
Report received on this patient. She is alert and oriented times 4. PIV in place. No known needs at this time.

## 2019-09-16 NOTE — CARE PLAN
Problem: Pain Management  Goal: Pain level will decrease to patient's comfort goal  Outcome: PROGRESSING AS EXPECTED  Flowsheets (Taken 9/16/2019 1111)  Comfort Goal: 4  Note:   Pt's abdominal pain is being controlled adequately; pt calls appropriately for PRN medications.     Problem: Discharge Barriers/Planning  Goal: Patient's continuum of care needs will be met  Outcome: PROGRESSING SLOWER THAN EXPECTED  Note:   Awaiting for pt's electrolytes to be more stable before going home. Pt verbalized understanding plan of care

## 2019-09-16 NOTE — PROGRESS NOTES
[facility] Medicine Daily Progress Note    Date of Service  9/15/2019    Chief Complaint  69 y.o. female admitted 9/13/2019 with intractable nausea, vomiting, diarrhea in the setting of nonoperable metastatic recurrent colon cancer    Hospital Course    Patient is a pleasant 69-year-old female who presented with intractable nausea vomiting and diarrhea with a reported 16 pound weight loss over the last 5 weeks.  She has recently started chemotherapy for her colon cancer and last received a dose 8 days prior to presentation.  She describes mucus in her throat that she has difficulty clearing and when she drinks water this thins the secretions and stomach fluids such that she develops projectile vomiting.  She has ongoing stomach pain from metastatic masses off of the stomach.  She follows with Dr. Brown.    Interval Problem Update  intermittent nausea    Afebrile    No sob or chest pain    No vomiting    Low potassium of 2.5    Consultants/Specialty  Palliative    Code Status  Changed 9/14/2019 to DNR/DNI    Disposition  To be determined    Review of Systems  Review of Systems   Constitutional: Positive for malaise/fatigue. Negative for chills, diaphoresis, fever and weight loss.   Eyes: Negative for blurred vision, double vision and photophobia.   Respiratory: Negative for cough, hemoptysis, sputum production, shortness of breath and wheezing.    Cardiovascular: Negative for chest pain, palpitations, orthopnea, claudication, leg swelling and PND.   Gastrointestinal: Positive for nausea. Negative for abdominal pain, constipation, diarrhea, heartburn and vomiting.   Genitourinary: Negative for dysuria, frequency, hematuria and urgency.   Skin: Negative for rash.   Neurological: Negative for dizziness, tingling, sensory change, focal weakness, seizures and headaches.   All other systems reviewed and are negative.       Physical Exam  Temp:  [36.6 °C (97.9 °F)-36.8 °C (98.2 °F)] 36.6 °C (97.9 °F)  Pulse:  [57-74]  68  Resp:  [17-18] 18  BP: ()/(45-56) 135/53  SpO2:  [97 %-100 %] 100 %    Physical Exam   Constitutional: She is oriented to person, place, and time. Vital signs are normal. She appears well-developed. She appears cachectic. No distress.   HENT:   Head: Normocephalic and atraumatic.   Eyes: Pupils are equal, round, and reactive to light. EOM are normal.   Neck: Neck supple.   Cardiovascular: Normal rate and regular rhythm. Exam reveals no gallop, no S3 and no S4.   Murmur heard.   Systolic murmur is present with a grade of 1/6.  Pulmonary/Chest: Effort normal and breath sounds normal. No respiratory distress. She has no wheezes. She has no rales.   Abdominal: Soft. She exhibits mass. She exhibits no distension and no ascites. Bowel sounds are increased. There is tenderness in the epigastric area and left upper quadrant. There is rebound.   Neurological: She is alert and oriented to person, place, and time. No cranial nerve deficit.   Skin: Skin is warm and dry.       Fluids    Intake/Output Summary (Last 24 hours) at 9/15/2019 1918  Last data filed at 9/15/2019 1035  Gross per 24 hour   Intake 240 ml   Output --   Net 240 ml       Laboratory  Recent Labs     09/13/19  1808 09/14/19  0438 09/15/19  0405   WBC 10.1 7.8 8.0   RBC 5.01 3.50* 3.62*   HEMOGLOBIN 14.5 10.2* 10.5*   HEMATOCRIT 43.9 31.1* 32.4*   MCV 87.6 88.9 89.5   MCH 28.9 29.1 29.0   MCHC 33.0* 32.8* 32.4*   RDW 55.1* 55.8* 57.2*   PLATELETCT 283 198 205   MPV 9.1 9.1 9.2     Recent Labs     09/13/19  1808 09/14/19  0438 09/15/19  0405 09/15/19  1136   SODIUM 131* 135 136  --    POTASSIUM 3.3* 3.6 2.5* 3.8   CHLORIDE 94* 108 107  --    CO2 16* 16* 17*  --    GLUCOSE 107* 63* 68  --    BUN 77* 53* 19  --    CREATININE 1.61* 0.98 0.59  --    CALCIUM 9.7 7.6* 7.5*  --                    Imaging  No orders to display        Assessment/Plan  * Nausea vomiting and diarrhea- (present on admission)  Assessment & Plan  No evidence of acute infectious  source this is likely related to patients chemotherapy      Rehydrate with IVF, anti emetics   Advance diet astolerated    Abdominal mass, LUQ (left upper quadrant)- (present on admission)  Assessment & Plan  Known metastatic mass to the stomach    PPI    Anemia- (present on admission)  Assessment & Plan  Multifactorial  Likely from chemotherapy inuced myelosuppression and colon/abdominal bleeding  Daily monitoring  Stop Heparin --- SCDs  Transfuse Hgb <7    DNR (do not resuscitate)  Assessment & Plan  In    High anion gap metabolic acidosis- (present on admission)  Assessment & Plan  This is likely due to severe dehydration, and possibly related to loss of bicarb from diarrhea   Check lactic, beta hydroxy   Aggressive IVF and recheck labs in am     Hypokalemia- (present on admission)  Assessment & Plan  Replace with iv potassium  Follow potassium daily    MUNIR (acute kidney injury) (HCC)- (present on admission)  Assessment & Plan  This is prerenal in etiology due to Gi losses, N/V/D  Cont with aggressive IVF   Monitor I/o strictly   Avoid nephrotoxic medications   Recheck renal function in am     Hyponatremia- (present on admission)  Assessment & Plan  This Is hypovolemic   Cont with IVF and recheck labs in am     Colon cancer metastasized to intra-abdominal lymph node (HCC)- (present on admission)  Assessment & Plan  History of   with current scheduled chemotherapy, last chemo 9/2/2019       VTE prophylaxis: SCDs    check am cbc, bmp

## 2019-09-16 NOTE — PROGRESS NOTES
Assumed care at 0700, report received from NOC RN.  Pt A&O x 4, states pain is 7/10--medication given per MAR. Bed locked, 2 rails up, bed in lowest position. Call light in place, belongings at bedside, no needs at this time and hourly rounding in place.     Pt verbalized understanding of plan of care; pt agrees. Pt up with observed steady gait. Pt does have some nausea this am--prn medication given.

## 2019-09-16 NOTE — CARE PLAN
Problem: Safety  Goal: Will remain free from injury  Outcome: PROGRESSING AS EXPECTED     Problem: Pain Management  Goal: Pain level will decrease to patient's comfort goal  Outcome: PROGRESSING AS EXPECTED     Problem: Communication  Goal: The ability to communicate needs accurately and effectively will improve  Outcome: PROGRESSING AS EXPECTED

## 2019-09-16 NOTE — DISCHARGE PLANNING
"+ SW met with patient for assessment and discharge planning.     + Patient is a 69 year old women, who lives alone in her apartment in [place], [place]. Patient has Medicare and no secondary insurance. She reports her pharmacy is [facility] on [address]. Patient has her daughter, Shannan who works full time as a teacher who lives next door to her. Patient reports PCP is \"Dr. Holt\" (pronouciation not correct she says), and oncologist, Dr. Carney.     + Patient reports only issue is that she takes special medications for her cancer treatment, which cost over $300.00. Patient reports she is not always able to afford this and does what she can.     Care Transition Team Assessment    Information Source  Orientation : Oriented x 4  Information Given By: Patient  Who is responsible for making decisions for patient? : Patient    Readmission Evaluation  Is this a readmission?: No    Elopement Risk  Legal Hold: No  Ambulatory or Self Mobile in Wheelchair: Yes  Disoriented: No  Psychiatric Symptoms: None  History of Wandering: No  Elopement this Admit: No  Vocalizing Wanting to Leave: No  Displays Behaviors, Body Language Wanting to Leave: No-Not at Risk for Elopement  Elopement Risk: Not at Risk for Elopement    Interdisciplinary Discharge Planning  Does Admitting Nurse Feel This Could be a Complex Discharge?: No  Lives with - Patient's Self Care Capacity: Alone and Unable to Care For Self  Patient or legal guardian wants to designate a caregiver (see row info): No  Support Systems: Family Member(s)  Housing / Facility: [address]  Do You Take your Prescribed Medications Regularly: Yes  Able to Return to Previous ADL's: Yes  Mobility Issues: Yes  Prior Services: None  Patient Expects to be Discharged to:: home  Assistance Needed: Yes  Durable Medical Equipment: Not Applicable    Discharge Preparedness  What is your plan after discharge?: Home with help  What are your discharge supports?: Child  Prior Functional Level: " Ambulatory, Drives Self, Independent with Medication Management  Difficulity with ADLs: None  Difficulity with IADLs: None    Functional Assesment  Prior Functional Level: Ambulatory, Drives Self, Independent with Medication Management    Finances  Financial Barriers to Discharge: Yes  Average Monthly Income: (Financial barriers include expensive cancer meds)  Prescription Coverage: Yes    Vision / Hearing Impairment  Vision Impairment : No  Hearing Impairment : Yes  Hearing Impairment: Right Ear  Does Pt Need Special Equipment for the Hearing Impaired?: No         Advance Directive  Advance Directive?: DPOA for Health Care  Durable Power of  Name and Contact : (On file with [facility] AD)    Domestic Abuse  Have you ever been the victim of abuse or violence?: No  Physical Abuse or Sexual Abuse: No  Verbal Abuse or Emotional Abuse: No  Possible Abuse Reported to:: Not Applicable    Psychological Assessment  History of Substance Abuse: None  History of Psychiatric Problems: No  Non-compliant with Treatment: No  Newly Diagnosed Illness: Yes    Discharge Risks or Barriers  Discharge risks or barriers?: Uninsured / underinsured, Complex medical needs, Other (comment)(No part B to Medicare, special perscriptions are $$$)  Patient risk factors: Uninsured or underinsured, Vulnerable adult    Anticipated Discharge Information  Anticipated discharge disposition: Home  Discharge Address: ([address] [place] [address] [facility] [place] [address])  Discharge Contact Phone Number: ([phone])    Continue to work with patient as discharge becomes closer. Reassess for needs at that time.

## 2019-09-16 NOTE — PROGRESS NOTES
[facility] Medicine Daily Progress Note    Date of Service  9/16/2019    Chief Complaint  69 y.o. female admitted 9/13/2019 with intractable nausea, vomiting, diarrhea in the setting of nonoperable metastatic recurrent colon cancer    Hospital Course    Patient is a pleasant 69-year-old female who presented with intractable nausea vomiting and diarrhea with a reported 16 pound weight loss over the last 5 weeks.  She has recently started chemotherapy for her colon cancer and last received a dose 8 days prior to presentation.  She describes mucus in her throat that she has difficulty clearing and when she drinks water this thins the secretions and stomach fluids such that she develops projectile vomiting.  She has ongoing stomach pain from metastatic masses off of the stomach.  She follows with Dr. Brown.    Interval Problem Update   nausea still present    Diarrhea still present    Still vomiting    Eating less than 50% of food    Does NOT feel well    Afebrile    No sob or chest pain    Low potassium of 3.2    Consultants/Specialty  Palliative    Code Status  Changed 9/14/2019 to DNR/DNI    Disposition  To be determined    Review of Systems  Review of Systems   Constitutional: Positive for malaise/fatigue. Negative for chills, diaphoresis, fever and weight loss.   Eyes: Negative for blurred vision, double vision and photophobia.   Respiratory: Negative for cough, hemoptysis, sputum production, shortness of breath and wheezing.    Cardiovascular: Negative for chest pain, palpitations, orthopnea, claudication, leg swelling and PND.   Gastrointestinal: Positive for diarrhea, nausea and vomiting. Negative for abdominal pain, constipation and heartburn.   Genitourinary: Negative for dysuria, frequency, hematuria and urgency.   Skin: Negative for rash.   Neurological: Negative for dizziness, tingling, sensory change, focal weakness, seizures and headaches.   All other systems reviewed and are negative.       Physical  Exam  Temp:  [36.4 °C (97.6 °F)-37.1 °C (98.7 °F)] 36.4 °C (97.6 °F)  Pulse:  [66-68] 68  Resp:  [16-18] 18  BP: (124-151)/(60-75) 137/70  SpO2:  [98 %-99 %] 98 %    Physical Exam   Constitutional: She is oriented to person, place, and time. Vital signs are normal. She appears well-developed. She appears cachectic. No distress.   HENT:   Head: Normocephalic and atraumatic.   Eyes: Pupils are equal, round, and reactive to light. EOM are normal. Right eye exhibits no discharge. Left eye exhibits no discharge. No scleral icterus.   Neck: Neck supple. No JVD present. No tracheal deviation present. No thyromegaly present.   Cardiovascular: Normal rate, regular rhythm and intact distal pulses. Exam reveals no gallop, no S3 and no S4.   Murmur heard.   Systolic murmur is present with a grade of 1/6.  Pulmonary/Chest: Effort normal and breath sounds normal. No respiratory distress. She has no wheezes. She has no rales.   Abdominal: Soft. She exhibits mass. She exhibits no distension and no ascites. Bowel sounds are increased. There is tenderness in the epigastric area and left upper quadrant. There is no rebound.   Musculoskeletal: She exhibits no edema or deformity.   Neurological: She is alert and oriented to person, place, and time. No cranial nerve deficit.   Skin: Skin is warm and dry.   Psychiatric: She has a normal mood and affect.       Fluids    Intake/Output Summary (Last 24 hours) at 9/16/2019 1409  Last data filed at 9/16/2019 0647  Gross per 24 hour   Intake 1800 ml   Output --   Net 1800 ml       Laboratory  Recent Labs     09/14/19  0438 09/15/19  0405 09/16/19  0505   WBC 7.8 8.0 6.7   RBC 3.50* 3.62* 3.67*   HEMOGLOBIN 10.2* 10.5* 11.0*   HEMATOCRIT 31.1* 32.4* 32.9*   MCV 88.9 89.5 89.6   MCH 29.1 29.0 30.0   MCHC 32.8* 32.4* 33.4*   RDW 55.8* 57.2* 59.5*   PLATELETCT 198 205 225   MPV 9.1 9.2 9.5     Recent Labs     09/14/19  0438 09/15/19  0405 09/15/19  1136 09/16/19  0505   SODIUM 135 136  --  134*    POTASSIUM 3.6 2.5* 3.8 3.2*   CHLORIDE 108 107  --  107   CO2 16* 17*  --  17*   GLUCOSE 63* 68  --  70   BUN 53* 19  --  8   CREATININE 0.98 0.59  --  0.44*   CALCIUM 7.6* 7.5*  --  7.3*                   Imaging  No orders to display        Assessment/Plan  * Nausea vomiting and diarrhea- (present on admission)  Assessment & Plan  No evidence of acute infectious source this is likely related to patients chemotherapy      Rehydrate with IVF, anti emetics   Advance diet astolerated    Abdominal mass, LUQ (left upper quadrant)- (present on admission)  Assessment & Plan  Known metastatic mass to the stomach    PPI    Anemia- (present on admission)  Assessment & Plan  Multifactorial  Likely from chemotherapy inuced myelosuppression and colon/abdominal bleeding  Daily monitoring   SCDs  Transfuse Hgb <7    High anion gap metabolic acidosis- (present on admission)  Assessment & Plan  This is likely due to severe dehydration, and possibly related to loss of bicarb from diarrhea   Check lactic, beta hydroxy   Aggressive IVF and recheck labs in am     Hypokalemia- (present on admission)  Assessment & Plan  Replace with ipo potassium  Follow potassium daily    MUNIR (acute kidney injury) (HCC)- (present on admission)  Assessment & Plan  This is prerenal in etiology due to Gi losses, N/V/D  Cont with aggressive IVF   Monitor I/o strictly   Avoid nephrotoxic medications   Recheck renal function in am     Hyponatremia- (present on admission)  Assessment & Plan  This Is hypovolemic   Cont with IVF and recheck labs in am     Colon cancer metastasized to intra-abdominal lymph node (HCC)- (present on admission)  Assessment & Plan  History of   with current scheduled chemotherapy, last chemo 9/2/2019       VTE prophylaxis: SCDs    check am cbc, bmp

## 2019-09-17 NOTE — PROGRESS NOTES
Report received on this patient. She is alert and oriented times 4. Family currently at the bedside. No known needs at this time.

## 2019-09-17 NOTE — PROGRESS NOTES
[facility] Medicine Daily Progress Note    Date of Service  9/17/2019    Chief Complaint  69 y.o. female admitted 9/13/2019 with intractable nausea, vomiting, diarrhea in the setting of nonoperable metastatic recurrent colon cancer    Hospital Course    Patient is a pleasant 69-year-old female who presented with intractable nausea vomiting and diarrhea with a reported 16 pound weight loss over the last 5 weeks.  She has recently started chemotherapy for her colon cancer and last received a dose 8 days prior to presentation.  She describes mucus in her throat that she has difficulty clearing and when she drinks water this thins the secretions and stomach fluids such that she develops projectile vomiting.  She has ongoing stomach pain from metastatic masses off of the stomach.  She follows with Dr. Brown.    Interval Problem Update  9/17: Feels better this morning.  Did not have any nausea or vomiting this morning.  Tolerating p.o. fairly.  Still have loose stools but more formed now.  No abdominal pain.  Afebrile overnight.  No issues overnight per staff.    Consultants/Specialty  Palliative    Code Status  Changed 9/14/2019 to DNR/DNI    Disposition  To be determined    Review of Systems  Review of Systems   Constitutional: Positive for malaise/fatigue. Negative for chills, diaphoresis, fever and weight loss.   HENT: Negative for hearing loss and tinnitus.    Eyes: Negative for blurred vision, double vision, photophobia and pain.   Respiratory: Negative for cough, hemoptysis and sputum production.    Cardiovascular: Negative for chest pain, palpitations, orthopnea and claudication.   Gastrointestinal: Positive for diarrhea, nausea and vomiting. Negative for abdominal pain, constipation and heartburn.   Genitourinary: Negative for dysuria, frequency, hematuria and urgency.   Musculoskeletal: Negative for myalgias and neck pain.   Skin: Negative for rash.   Neurological: Negative for dizziness, tingling, sensory  change, focal weakness, seizures and headaches.        Physical Exam  Temp:  [36.2 °C (97.1 °F)-36.6 °C (97.8 °F)] 36.2 °C (97.1 °F)  Pulse:  [63-73] 63  Resp:  [17-18] 18  BP: (126-140)/(57-78) 126/57  SpO2:  [92 %-98 %] 98 %    Physical Exam   Constitutional: She is oriented to person, place, and time. Vital signs are normal. She appears cachectic. No distress.   HENT:   Head: Normocephalic and atraumatic.   Eyes: Pupils are equal, round, and reactive to light. EOM are normal. Right eye exhibits no discharge. Left eye exhibits no discharge.   Neck: Neck supple. No tracheal deviation present. No thyromegaly present.   Cardiovascular: Normal rate, regular rhythm and intact distal pulses. Exam reveals no gallop, no S3 and no S4.   Murmur heard.   Systolic murmur is present with a grade of 1/6.  Pulmonary/Chest: Effort normal and breath sounds normal. No respiratory distress. She has no wheezes.   Abdominal: Soft. She exhibits mass. She exhibits no distension and no ascites. Bowel sounds are increased. There is tenderness in the epigastric area and left upper quadrant. There is no rebound.   Musculoskeletal: She exhibits no tenderness or deformity.   Neurological: She is alert and oriented to person, place, and time.   Skin: Skin is warm and dry. She is not diaphoretic.   Psychiatric: She has a normal mood and affect. Her behavior is normal.       Fluids    Intake/Output Summary (Last 24 hours) at 9/17/2019 1527  Last data filed at 9/17/2019 0900  Gross per 24 hour   Intake 2145 ml   Output --   Net 2145 ml       Laboratory  Recent Labs     09/15/19  0405 09/16/19  0505 09/17/19  0235   WBC 8.0 6.7 7.5   RBC 3.62* 3.67* 3.76*   HEMOGLOBIN 10.5* 11.0* 11.4*   HEMATOCRIT 32.4* 32.9* 33.6*   MCV 89.5 89.6 89.4   MCH 29.0 30.0 30.3   MCHC 32.4* 33.4* 33.9   RDW 57.2* 59.5* 59.8*   PLATELETCT 205 225 240   MPV 9.2 9.5 9.3     Recent Labs     09/15/19  0405 09/15/19  1136 09/16/19  0505 09/17/19  0235   SODIUM 136  --  134*  137   POTASSIUM 2.5* 3.8 3.2* 3.3*   CHLORIDE 107  --  107 110   CO2 17*  --  17* 17*   GLUCOSE 68  --  70 68   BUN 19  --  8 4*   CREATININE 0.59  --  0.44* 0.51   CALCIUM 7.5*  --  7.3* 7.8*                   Imaging  No orders to display        Assessment/Plan  * Nausea vomiting and diarrhea- (present on admission)  Assessment & Plan  Likely chemo induced  Continue with fluid resuscitation along with as needed medications for nausea and vomiting.  PRN diarrhea medications as well per    Abdominal mass, LUQ (left upper quadrant)- (present on admission)  Assessment & Plan  Known metastatic mass to the stomach    PPI    Anemia- (present on admission)  Assessment & Plan  Multifactorial  Likely from chemotherapy inuced myelosuppression and colon/abdominal bleeding  Daily monitoring   SCDs  Transfuse Hgb <7    DNR (do not resuscitate)  Assessment & Plan  Confirmed by patient.    High anion gap metabolic acidosis- (present on admission)  Assessment & Plan  Resolved with IV hydration    Hypokalemia- (present on admission)  Assessment & Plan  On replacement  Continue to monitor    MUNIR (acute kidney injury) (HCC)- (present on admission)  Assessment & Plan  This is prerenal in etiology due to Gi losses, N/V/D  Resolved with IV hydration    Hyponatremia- (present on admission)  Assessment & Plan  This Is hypovolemic   Cont with IVF and recheck labs in am     Colon cancer metastasized to intra-abdominal lymph node (HCC)- (present on admission)  Assessment & Plan  Stage IV  with current scheduled chemotherapy, last chemo 9/2/2019  Plan of care discussed with multidisciplinary team during rounds.    VTE prophylaxis: SCDs

## 2019-09-17 NOTE — CARE PLAN
Problem: Pain Management  Goal: Pain level will decrease to patient's comfort goal  Note:   Patient medicated per MAR for pain management.     Problem: Infection  Goal: Will remain free from infection  Note:   Patient educated about strict handwashing after using the restroom.

## 2019-09-17 NOTE — PROGRESS NOTES
Received report from night RN, assumed care at 0700. Pt A&OX4, able to specify needs. Pt up SBA, maintains steady gait. Pt with port to right chest, running IVF per MAR. Pt with complaints of pain to abdomen, medicated per MAR. Pt states nausea is better from previous day. Fall precautions in place. POC discussed, communication board updated. Call light in reach, hourly rounding in place.

## 2019-09-17 NOTE — CARE PLAN
Problem: Pain Management  Goal: Pain level will decrease to patient's comfort goal  Outcome: PROGRESSING AS EXPECTED     Problem: Communication  Goal: The ability to communicate needs accurately and effectively will improve  Outcome: PROGRESSING AS EXPECTED     Problem: Infection  Goal: Will remain free from infection  Outcome: PROGRESSING AS EXPECTED

## 2019-09-17 NOTE — CARE PLAN
Problem: Nutritional:  Goal: Achieve adequate nutritional intake  Description  Patient will tolerate po diet with at least 50% intake of meals   Outcome: PROGRESSING AS EXPECTED  Nausea better today. Good intake of breakfast.

## 2019-09-18 NOTE — CARE PLAN
Problem: Safety  Goal: Will remain free from injury  Outcome: PROGRESSING AS EXPECTED     Problem: Infection  Goal: Will remain free from infection  Outcome: PROGRESSING AS EXPECTED    Appropriate ABX prescribed and administered. Pt progressing towards remaining free from infection.

## 2019-09-18 NOTE — CARE PLAN
Problem: Communication  Goal: The ability to communicate needs accurately and effectively will improve  Outcome: PROGRESSING AS EXPECTED  Note:   Educated patient on the use of call light for assistance. Went over controls and functions on the remote. Answered any questions patient had. Patient has been calling appropriately.      Problem: Infection  Goal: Will remain free from infection  Outcome: PROGRESSING AS EXPECTED  Note:   Proper hand hygiene was performed before and after patient contact. Waste materials have been disposed of properly. Patient educated on the importance of washing hands to prevent spread of infection.

## 2019-09-18 NOTE — PROGRESS NOTES
[facility] Medicine Daily Progress Note    Date of Service  9/18/2019    Chief Complaint  69 y.o. female admitted 9/13/2019 with intractable nausea, vomiting, diarrhea in the setting of nonoperable metastatic recurrent colon cancer    Hospital Course    Patient is a pleasant 69-year-old female who presented with intractable nausea vomiting and diarrhea with a reported 16 pound weight loss over the last 5 weeks.  She has recently started chemotherapy for her colon cancer and last received a dose 8 days prior to presentation.  She describes mucus in her throat that she has difficulty clearing and when she drinks water this thins the secretions and stomach fluids such that she develops projectile vomiting.  She has ongoing stomach pain from metastatic masses off of the stomach.  She follows with Dr. Brown.    Interval Problem Update  9/17: Feels better this morning.  Did not have any nausea or vomiting this morning.  Tolerating p.o. fairly.  Still have loose stools but more formed now.  No abdominal pain.  Afebrile overnight.  No issues overnight per staff.  9/18: Resting comfortably in bed.  Stated that she was in pain all last night.  Vomited once last night after a meal.  No nausea or vomiting this morning.  Tolerating p.o. intake fairly.  Bowel movements still loose.  No melena or hematochezia.  Afebrile overnight.  No other issues to report.  Consultants/Specialty  Palliative    Code Status  Changed 9/14/2019 to DNR/DNI    Disposition  Home once medically cleared.    Review of Systems  Review of Systems   Constitutional: Positive for malaise/fatigue. Negative for chills, diaphoresis, fever and weight loss.   HENT: Negative for ear pain, hearing loss and tinnitus.    Eyes: Negative for blurred vision, double vision, photophobia, pain and discharge.   Respiratory: Negative for cough, hemoptysis and sputum production.    Cardiovascular: Negative for chest pain, palpitations and orthopnea.   Gastrointestinal: Positive  for diarrhea (Improving), nausea (Improving) and vomiting (Improving). Negative for abdominal pain, constipation and heartburn.   Genitourinary: Negative for dysuria, frequency, hematuria and urgency.   Musculoskeletal: Negative for back pain, myalgias and neck pain.   Skin: Negative for rash.   Neurological: Negative for dizziness, tingling, tremors, sensory change and headaches.        Physical Exam  Temp:  [36.2 °C (97.2 °F)-36.7 °C (98.1 °F)] 36.4 °C (97.5 °F)  Pulse:  [61-71] 66  Resp:  [16-18] 16  BP: (128-156)/(59-68) 149/68  SpO2:  [96 %-99 %] 99 %    Physical Exam   Constitutional: She is oriented to person, place, and time. Vital signs are normal. She appears cachectic. No distress.   Frail   HENT:   Head: Normocephalic and atraumatic.   Eyes: Pupils are equal, round, and reactive to light. EOM are normal. Right eye exhibits no discharge. Left eye exhibits no discharge.   Neck: Neck supple. No thyromegaly present.   Cardiovascular: Normal rate, regular rhythm and intact distal pulses. Exam reveals no gallop, no S3 and no S4.   Murmur heard.   Systolic murmur is present with a grade of 1/6.  Pulmonary/Chest: Effort normal and breath sounds normal. No respiratory distress. She has no wheezes.   Abdominal: Soft. She exhibits mass. She exhibits no distension and no ascites. Bowel sounds are increased. There is no rebound and no guarding.   Musculoskeletal: She exhibits no tenderness or deformity.   Neurological: She is alert and oriented to person, place, and time. No cranial nerve deficit.   Skin: Skin is warm and dry. She is not diaphoretic.   Psychiatric: She has a normal mood and affect. Her behavior is normal. Thought content normal.       Fluids  No intake or output data in the 24 hours ending 09/18/19 1456    Laboratory  Recent Labs     09/16/19  0505 09/17/19  0235 09/18/19  0300   WBC 6.7 7.5 6.0   RBC 3.67* 3.76* 3.61*   HEMOGLOBIN 11.0* 11.4* 10.8*   HEMATOCRIT 32.9* 33.6* 32.5*   MCV 89.6 89.4 90.0    MCH 30.0 30.3 29.9   MCHC 33.4* 33.9 33.2*   RDW 59.5* 59.8* 61.7*   PLATELETCT 225 240 226   MPV 9.5 9.3 9.6     Recent Labs     09/16/19  0505 09/17/19  0235 09/18/19  0300   SODIUM 134* 137 133*   POTASSIUM 3.2* 3.3* 3.2*   CHLORIDE 107 110 106   CO2 17* 17* 18*   GLUCOSE 70 68 76   BUN 8 4* 3*   CREATININE 0.44* 0.51 0.39*   CALCIUM 7.3* 7.8* 7.4*                   Imaging  No orders to display        Assessment/Plan  * Nausea vomiting and diarrhea- (present on admission)  Assessment & Plan  Likely chemo induced  Continue with fluid resuscitation along with as needed medications for nausea and vomiting.  PRN diarrhea medications as well.    Abdominal mass, LUQ (left upper quadrant)- (present on admission)  Assessment & Plan  Known metastatic mass to the stomach        Anemia- (present on admission)  Assessment & Plan  Multifactorial  Likely from chemotherapy inuced myelosuppression and colon/abdominal bleeding  Daily monitoring   SCDs  Transfuse Hgb <7    DNR (do not resuscitate)  Assessment & Plan  Confirmed by patient.    High anion gap metabolic acidosis- (present on admission)  Assessment & Plan  Resolved with IV hydration    Hypokalemia- (present on admission)  Assessment & Plan  On replacement  Continue to monitor  9/18: Still not corrected. Continue supplements. Will check Mg levels.     MUNIR (acute kidney injury) (HCC)- (present on admission)  Assessment & Plan  This is prerenal in etiology due to Gi losses, N/V/D  Resolved with IV hydration    Hyponatremia- (present on admission)  Assessment & Plan  This Is hypovolemic   Cont with IVF and recheck labs in am     Colon cancer metastasized to intra-abdominal lymph node (HCC)- (present on admission)  Assessment & Plan  Stage IV  last chemo 9/2/2019 complicated by GI symptoms.   Plan of care discussed with multidisciplinary team during rounds.    VTE prophylaxis: SCDs

## 2019-09-19 PROBLEM — E83.42 HYPOMAGNESEMIA: Status: ACTIVE | Noted: 2019-01-01

## 2019-09-19 NOTE — PROGRESS NOTES
Assumed care of patient at 0700 . Received report from RN. Patient is AOX4 . Assessment complete. Labs reviewed.Patient and RN discussed plan of care. Patient questions answered. Patient needs are met at this time. Bed in lowest and locked position. Upper bed rails up.  Call light is within reach. Hourly rounding in place. /53   Pulse 87   Temp 36 °C (96.8 °F) (Temporal)   Resp 16   Ht 1.524 m (5')   Wt 48.9 kg (107 lb 12.9 oz)   SpO2 98%   BMI 21.05 kg/m²

## 2019-09-19 NOTE — PROGRESS NOTES
Assumed care of patient at 1900. Received bedside report from day RN. Patient resting comfortably in bed. No signs of distress. Bed is in low and locked position. Non-slip socks are in place. Call light is within reach and patient calls appropriately.

## 2019-09-19 NOTE — CARE PLAN
Problem: Communication  Goal: The ability to communicate needs accurately and effectively will improve  Outcome: PROGRESSING AS EXPECTED  Note:   Educated patient on the use of call light for assistance. Went over controls and functions on the remote. Answered any questions patient had. Patient has been calling appropriately.      Problem: Infection  Goal: Will remain free from infection  Note:   Proper hand hygiene was performed before and after patient contact. Waste materials have been disposed of properly. Patient educated on the importance of washing hands to prevent spread of infection.

## 2019-09-19 NOTE — DISCHARGE PLANNING
SW completed chart review. Follow up with rounds on patients prognosis and anticipated discharge date.

## 2019-09-19 NOTE — CARE PLAN
Problem: Safety  Goal: Will remain free from injury  Outcome: PROGRESSING AS EXPECTED  Note:   Bed in lowest position, call light within reach. Bed alarm on. Patient educated regarding safety precautions. Room free of clutter.       Problem: Infection  Goal: Will remain free from infection  Outcome: PROGRESSING AS EXPECTED  Note:   Educated patient on proper handwashing techniques with soap and water. Explained the importance of washing hands after using the restroom, before and after eating. Hand  is available on the walls of the rooms for use when hands are not visibly soiled.

## 2019-09-19 NOTE — DISCHARGE PLANNING
SW met with patient, provided her with Senior Rx paperwork from aging and disability services. Patient reported she has no other needs at this time.

## 2019-09-20 PROBLEM — N17.9 AKI (ACUTE KIDNEY INJURY) (HCC): Status: RESOLVED | Noted: 2019-01-01 | Resolved: 2019-01-01

## 2019-09-20 PROBLEM — R19.7 NAUSEA VOMITING AND DIARRHEA: Status: RESOLVED | Noted: 2019-01-01 | Resolved: 2019-01-01

## 2019-09-20 PROBLEM — R11.2 NAUSEA VOMITING AND DIARRHEA: Status: RESOLVED | Noted: 2019-01-01 | Resolved: 2019-01-01

## 2019-09-20 PROBLEM — E83.42 HYPOMAGNESEMIA: Status: RESOLVED | Noted: 2019-01-01 | Resolved: 2019-01-01

## 2019-09-20 PROBLEM — E87.29 HIGH ANION GAP METABOLIC ACIDOSIS: Status: RESOLVED | Noted: 2019-01-01 | Resolved: 2019-01-01

## 2019-09-20 PROBLEM — E87.6 HYPOKALEMIA: Status: RESOLVED | Noted: 2019-01-01 | Resolved: 2019-01-01

## 2019-09-20 NOTE — CARE PLAN
Problem: Infection  Goal: Will remain free from infection  Outcome: PROGRESSING AS EXPECTED  Note:   Proper hand hygiene was performed before and after patient contact. Waste materials have been disposed of properly. Patient educated on the importance of washing hands to prevent spread of infection.      Problem: Knowledge Deficit  Goal: Knowledge of disease process/condition, treatment plan, diagnostic tests, and medications will improve  Outcome: PROGRESSING AS EXPECTED  Note:   Went over plan of care with patient and answered any questions patient had.

## 2019-09-20 NOTE — PROGRESS NOTES
Assumed care of patient at 1900. Received bedside report from day RN. Patient resting comfortably in bed with family at bedside. No signs of distress. Bed is in low and locked position. Non-slip socks are in place. Call light is within reach and patient has been calling appropriately.

## 2019-09-20 NOTE — CARE PLAN
Problem: Nutritional:  Goal: Achieve adequate nutritional intake  Description  Patient will tolerate po diet with at least 50% intake of meals   Outcome: MET

## 2019-09-20 NOTE — DISCHARGE PLANNING
SW checked in with patient, informed her of possible discharge today. Patient reports she does not need any referrals, feels as though all her care is set up already. Patient thanked SW.   Plan: Patient is cleared to discharge upon medical clearance.

## 2019-09-20 NOTE — DISCHARGE INSTRUCTIONS
Discharge Instructions    Discharged to home by car with relative. Discharged via wheelchair, hospital escort: Yes.  Special equipment needed: Not Applicable    Be sure to schedule a follow-up appointment with your primary care doctor or any specialists as instructed.     Discharge Plan:   Influenza Vaccine Indication: Patient Refuses    I understand that a diet low in cholesterol, fat, and sodium is recommended for good health. Unless I have been given specific instructions below for another diet, I accept this instruction as my diet prescription.   Other diet: Regular Diet    Special Instructions: None    · Is patient discharged on Warfarin / Coumadin?   No     Depression / Suicide Risk    As you are discharged from this [facility] facility, it is important to learn how to keep safe from harming yourself.    Recognize the warning signs:  · Abrupt changes in personality, positive or negative- including increase in energy   · Giving away possessions  · Change in eating patterns- significant weight changes-  positive or negative  · Change in sleeping patterns- unable to sleep or sleeping all the time   · Unwillingness or inability to communicate  · Depression  · Unusual sadness, discouragement and loneliness  · Talk of wanting to die  · Neglect of personal appearance   · Rebelliousness- reckless behavior  · Withdrawal from people/activities they love  · Confusion- inability to concentrate     If you or a loved one observes any of these behaviors or has concerns about self-harm, here's what you can do:  · Talk about it- your feelings and reasons for harming yourself  · Remove any means that you might use to hurt yourself (examples: pills, rope, extension cords, firearm)  · Get professional help from the community (Mental Health, Substance Abuse, psychological counseling)  · Do not be alone:Call your Safe Contact- someone whom you trust who will be there for you.  · Call your local CRISIS HOTLINE 469-7848 or  584.419.9265  · Call your local Children's Mobile Crisis Response Team [place] (279) 880-9871 or www.LemonCrate.Vokle  · Call the toll free National Suicide Prevention Hotlines   · National Suicide Prevention Lifeline 183-982-BRPR (4966)  · National Hope Line Network 800-SUICIDE (964-8231)    Increase clear liquids/popsicles or anything that you can tolerate taking in.  Try Ensure or boost  Follow-up with oncology on Monday as scheduled  Rest  Use the medications for nausea as needed  Return if any problems or worsening.

## 2019-09-21 NOTE — DISCHARGE SUMMARY
Discharge Summary    CHIEF COMPLAINT ON ADMISSION  Chief Complaint   Patient presents with   • Nausea/Vomiting/Diarrhea       Reason for Admission  vomiting     Admission Date  9/13/2019    CODE STATUS  DNAR/DNI    HPI & HOSPITAL COURSE     Patient is a pleasant 69-year-old female who presented with intractable nausea vomiting and diarrhea with a reported 16 pound weight loss over the last 5 weeks.  She has recently started chemotherapy for her colon cancer and last received a dose 8 days prior to presentation.  She describes mucus in her throat that she has difficulty clearing and when she drinks water this thins the secretions and stomach fluids such that she develops projectile vomiting.  She has ongoing stomach pain from metastatic masses off of the stomach.  She follows with Dr. Brown.  Symptoms improved gradually over hospital course.  Nausea and vomiting has resolved.  Diarrhea also much improved with PRN Imodium.  Patient was tolerating p.o. fairly.  She was hemodynamically and clinically stable.  She was cleared for discharge from medical standpoint.          Therefore, she is discharged in guarded and stable condition to home with close outpatient follow-up.    The patient met 2-midnight criteria for an inpatient stay at the time of discharge.    Discharge Date  9/20/19    FOLLOW UP ITEMS POST DISCHARGE  Follow-up with PCP in 1 week  Follow-up with oncology as per recommendations    DISCHARGE DIAGNOSES  Principal Problem (Resolved):    Nausea vomiting and diarrhea POA: Yes  Active Problems:    Abdominal mass, LUQ (left upper quadrant) POA: Yes    Anemia POA: Yes    Colon cancer metastasized to intra-abdominal lymph node (HCC) POA: Yes    Hyponatremia POA: Yes    DNR (do not resuscitate) POA: Unknown  Resolved Problems:    MUNIR (acute kidney injury) (HCC) POA: Yes    Hypokalemia POA: Yes    High anion gap metabolic acidosis POA: Yes    Hypomagnesemia POA: Yes      FOLLOW UP  Future Appointments   Date Time  Provider Department Center   9/22/2019  9:30 AM [facility] [address]   9/23/2019 10:15 AM [facility] [address]   9/25/2019  5:00 PM [facility] [address]     Your oncologist    In 3 days  For recheck and further treatment    PCP at [facility]    Schedule an appointment as soon as possible for a visit in 2 weeks        MEDICATIONS ON DISCHARGE     Medication List      START taking these medications      Instructions   loperamide 2 MG Caps  Commonly known as:  IMODIUM   Take 2 Caps by mouth 4 times a day as needed for Diarrhea.  Dose:  4 mg     magnesium oxide 400 (241.3 Mg) MG Tabs tablet  Start taking on:  9/21/2019  Commonly known as:  MAG-OX   Take 1 Tab by mouth every day.  Dose:  400 mg        CONTINUE taking these medications      Instructions   HYDROcodone-acetaminophen 7.5-325 MG per tablet  Commonly known as:  NORCO   Take 1-2 Tabs by mouth every 8 hours as needed.  Dose:  1-2 Tab     Morphine Sulfate ER 15 MG Tbea   Take 15 mg by mouth 3 times a day as needed.  Dose:  15 mg     * ondansetron 8 MG Tbdp  Commonly known as:  ZOFRAN ODT   Take 8 mg by mouth every 6 hours as needed for Nausea.  Dose:  8 mg     * ondansetron 4 MG Tbdp  Commonly known as:  ZOFRAN ODT   Take 1 Tab by mouth every 6 hours as needed.  Dose:  4 mg     prochlorperazine 25 MG Supp  Commonly known as:  COMPAZINE   Insert 1 Suppository in rectum every 6 hours as needed for Nausea/Vomiting.  Dose:  25 mg     prochlorperazine 5 MG Tabs  Commonly known as:  COMPAZINE   Take 1 Tab by mouth every 6 hours as needed for Nausea/Vomiting.  Dose:  5 mg     REMERON 30 MG Tabs tablet  Generic drug:  mirtazapine   Take 30 mg by mouth every evening.  Dose:  30 mg     vitamin D (Ergocalciferol) 57356 units Caps capsule  Commonly known as:  DRISDOL   Take 50,000 Units by mouth every 7 days.  Dose:  50,000 Units         * This list has 2 medication(s) that are the same as other medications prescribed  for you. Read the directions carefully, and ask your doctor or other care provider to review them with you.            STOP taking these medications    polyethylene glycol/lytes Pack  Commonly known as:  MIRALAX            Allergies  Allergies   Allergen Reactions   • Food Shortness of Breath and Swelling     Walnuts     • Pcn [Penicillins] Anaphylaxis and Swelling       DIET  Orders Placed This Encounter   Procedures   • Diet Order Regular     Standing Status:   Standing     Number of Occurrences:   1     Order Specific Question:   Diet:     Answer:   Regular [1]   • Discontinue Diet Tray     Standing Status:   Standing     Number of Occurrences:   1       ACTIVITY  As tolerated.  Weight bearing as tolerated    CONSULTATIONS  None    PROCEDURES  None    LABORATORY  Lab Results   Component Value Date    SODIUM 134 (L) 09/20/2019    POTASSIUM 3.9 09/20/2019    CHLORIDE 109 09/20/2019    CO2 18 (L) 09/20/2019    GLUCOSE 64 (L) 09/20/2019    BUN <3 (L) 09/20/2019    CREATININE 0.37 (L) 09/20/2019        Lab Results   Component Value Date    WBC 4.6 (L) 09/20/2019    HEMOGLOBIN 10.5 (L) 09/20/2019    HEMATOCRIT 31.2 (L) 09/20/2019    PLATELETCT 259 09/20/2019        Total time of the discharge process exceeds 40 minutes.

## 2019-09-23 NOTE — PROGRESS NOTES
Chemotherapy Verification - SECONDARY RN       Height = 153 cm  Weight = 51.2 kg  BSA = 1.48 m2       Medication: Irinotecan  Dose: 180 mg/m2  Calculated Dose: 266.4 mg                             (In mg/m2, AUC, mg/kg)     Medication: Adrucil  Dose: 400 mg/m2  Calculated Dose: 592 mg                             (In mg/m2, AUC, mg/kg)    Medication: Adrucil  Dose: 2,400 mg/m2  Calculated Dose: 3.552 mg over 46 hours                             (In mg/m2, AUC, mg/kg)    Medication: AvastinDose: 5 mg/kg  Calculated Dose: 256 mg                             (In mg/m2, AUC, mg/kg)        I confirm that this process was performed independently.

## 2019-09-23 NOTE — PROGRESS NOTES
Chemotherapy Verification - PRIMARY RN      Height = 153 cm  Weight = 51.2 kg  BSA = 1.48 m^2       Medication: Irinotecan  Dose: 180 mg/m^2  Calculated Dose: 266.4 mg                             (In mg/m2, AUC, mg/kg)     Medication: Fluorouracil Bolus  Dose: 400 mg/m^2  Calculated Dose: 592 mg                             (In mg/m2, AUC, mg/kg)    Medication: Fluorouracil  Dose: 2400 mg/m^2  Calculated Dose: 3552 mg over 46 hours.                             (In mg/m2, AUC, mg/kg)    Medication: Avastin  Dose: 5 mg/kg  Calculated Dose: 256 mg                             (In mg/m2, AUC, mg/kg)    I confirm this process was performed independently with the BSA and all final chemotherapy dosing calculations congruent.  Any discrepancies of 10% or greater have been addressed with the chemotherapy pharmacist. The resolution of the discrepancy has been documented in the EPIC progress notes.

## 2019-09-23 NOTE — PROGRESS NOTES
"Pharmacy Chemotherapy Calculations:    Patient Name: Mirna Black  DX: Colon Cancer    Cycle 11 (delayed d/t hospital admission)  Previous treatment: C10 = 09/02/19    Protocol: FOLFiri + Bevacizumab  Irinotecan 180 mg/m2 IV on day 1  Leucovorin 400 mg/m2 iv on day 1 concurrently with irinotecan  5- mg/m2 iv bolus d1 followed by 2400 mg/m2 iv over 46 hrs  Bevacizumab 5 mg/kg IV on day 1   -08/14/19: HOLD Avastin Cycle 9 due to proteinuria per MD.    -09/02/19: Ok to resume Avastin Cycle 10 per Dr. Brown   Q14 days for 8-12 cycles  NCCN Guidelines for Colon Cancer V.2.2018  Matt V, Et al - Lancet Oncol. 2014 Sep;15(10):1065-75.   Abilio RHOADES et al - J Clin Oncol. 2007 Oct 20;25(30):7643-54.    Allergies: Food and Pcn [penicillins]    /62   Pulse 73   Temp 36.1 °C (97 °F) (Temporal)   Resp 18   Ht 1.53 m (5' 0.25\")   Wt 51.2 kg (112 lb 14 oz)   SpO2 96%   BMI 21.86 kg/m²  Body surface area is 1.48 meters squared.    Labs 09/20/19  ANC ~2200 Hgb = 10.5 Plt = 259k SCr = 0.37 mg/dL CrCl ~60 mL/min (min SCr 0.7 mg/dL)  AST/ALT/AP = WNL Tbili = 0.3     9/23/2019 11:59   POC Protein 30 (A)     Irinotecan (Camptosar) 180 mg/m2 x 1.48 m2 = 266.4 mg    <10% difference, ok to treat with final dose = 266.4 mg IV    Leucovorin 400 mg/m2 x 1.48 m2 = 592 mg    <10% difference, ok to treat with final dose = 592 mg IV    Fluorouracil (5-FU) 400 mg/m2 x 1.48 m2 = 592 mg    <10% difference, ok to treat with final dose = 590 mg IV    Fluorouracil (5-FU) 5-FU 2400 mg/m2 x 1.48 m2 = 3552 mg    <10% difference, ok to treat with final dose = 3550 mg    CIVI over 46 hours via CADD pump at 1.5 mL/hr  Bevacizumab (Avastin) 5 mg/kg x 51.2 kg = 256 mg    Unable to round to vial size (> 10%) per dose rounding protocol.    OK to treat with final dose = 256 mg IV    Melissa Burgos, PharmD, BCOP        "

## 2019-09-23 NOTE — PROGRESS NOTES
"Pharmacy Chemotherapy Verification  Patient Name: Mirna Black   Dx: Colorectal CA    Protocol: FOLFIRI + Bevacizumab  Irinotecan 180 mg/m2 IV over 30-90 minutes on Day 1 concurrent with   Leucovorin 400 mg/m2 IV over 30-90 minutes on Day 1 followed by   Fluorouracil 400 mg/m2 IV push on Day 1 followed by  Fluorouracil 1500 mg/m2 IV continuous infusion daily on Days 1-2 (2400 mg/m2 IV over 46-48 hours)  Bevacizumab 5 mg/kg IV on Day 1  14 day cycle until disease progression or unacceptable toxicity  NCCN Guidelines for Colon Cancer.V.2.2018  Matt V, et al. Lancet Oncol. 2014;15(10):1065-75  Abilio CS, et al. J Clin Oncol.2007;25(30):6668-93    Allergies:  Walnuts and Pcn [penicillins]  /62   Pulse 73   Temp 36.1 °C (97 °F) (Temporal)   Resp 18   Ht 1.53 m (5' 0.25\")   Wt 51.2 kg (112 lb 14 oz)   SpO2 96%   BMI 21.86 kg/m²  Body surface area is 1.48 meters squared.    Labs 9/20/19  ANC~ 2180 Plt = 259k   Hgb = 10.5     SCr = 0.37 mg/dL CrCl ~ 59.5 mL/min (minimum SCr of 0.7)   LFT's = WNLs  TBili = 0.3    Labs 9/23/19  ISTAT Urine protein = 30 mg/dL (1+)    BP = 129/62    Drug Order   (Drug name, dose, route, IV Fluid & volume, frequency, number of doses) Cycle: 11 (5th cycle @ [facility]) delayed for hospitalization for N/V/D)  Previous treatment: C10 on 9/2/19; Cycles 1-6 given at outside facility     Medication = Irinotecan  Base Dose = 180 mg/m2  Calc Dose: Base Dose x 1.48 m2 = 266.4 mg  Final Dose = 266.4 mg  Route = IV  Fluid & Volume = D5W 500 mL  Admin Duration = Over 90 minutes   Run concurrently with leucovorin       <10% difference, OK to treat with final dose   Medication = Leucovorin   Base Dose = 400 mg/m2  Calc Dose: Base Dose x 1.48 m2 = 592 mg  Final Dose = 592 mg  Route = IV  Fluid & Volume = D5W 250 mL  Admin Duration = Over 90 minutes   Run concurrently with irinotecan       <10% difference, OK to treat with final dose    Medication = Fluorouracil   Base Dose = 400 mg/m2   Calc " Dose:Base Dose x 1.48 m2 = 592 mg  Final Dose = 590 mg  Route = IV  Fluid & Volume = 50 mg/mL= 11.8 mL  Admin Duration = Over 5 minutes          <10% difference, OK to treat with final dose    Medication = Fluorouracil   Base Dose = 2400 mg/m2  Calc Dose: Base Dose x 1.48 m2 = 3552 mg  Final Dose = 3550 mg  Route = IV  Fluid & Volume = 50 mg/mL; 71 mL (+ 3 mL overfill = 74 mL)  Admin Duration = Over 46 hours @ 1.5 mL/hr          <10% difference, OK to treat with final dose    Medication = Bevacizumab  Base Dose = 5 mg/kg  Calc Dose: Base Dose x 51.2 kg = 256 mg  Final Dose = 256 mg  Route = IV  Fluid & Volume =  mL  Admin Duration = Over 30 minutes       <10% difference, OK to treat with final dose      By my signature below, I confirm this process was performed independently with the BSA and all final chemotherapy dosing calculations congruent. I have reviewed the above chemotherapy order and that my calculation of the final dose and BSA (when applicable) corroborate those calculations of the  pharmacist. Discrepancies of 10% or greater in the written dose have been addressed and documented within the EPIC Progress notes.    Lokesh Bess, AngelineD

## 2019-09-24 NOTE — PROGRESS NOTES
Patient here for FOLFiri + Bevacizumab. Lidocaine used prior to accessing port. Port accessed using sterile technique; brisk blood return noted. Labs previously drawn and reviewed today. Pre-medications given per MAR. Magnesium replaced per electrolyte protocol. Atropine given prior to Irinotecan. Irinotecan and Leucovorin given concurrently. Avastin given per MAR. 5FU bolus given over 5 minutes. Patient then connected to 5FU CADD pump. Total volume = 72.1 ml. Minimum to infuse = 71 ml. Discharged to self care; no apparent distress noted.

## 2019-09-26 NOTE — PROGRESS NOTES
Pt to infusion services ambulatory per self with walker.  Pt here for scheduled disconnect from CIVI of 5FU via CADD pump.  Plan of care reviewed.  Pt verbalizes understanding.  Pt reports + nausea and reports vomiting at home.  Pt tells me she has run out of her anti-nausea medications, however contacted Dr. Brown's office this morning and will be picking up new prescription at her pharmacy after completion of this appointment.  Pt reports no issues with pump.  Pump settings verified.  Reservoir volume = 0.0 mL.  Amount given = 72.1 mL.  Pump turned off.  Tubing disconnected from patient.  Port flushed with normal saline and heparin per policy.  Port de-accessed; needle intact.  Pressure dressing applied.  Pt left infusion services in no apparent distress after completion of treatment, ambulatory.  Pt to return in 2 weeks; next appointment scheduled.

## 2019-10-02 NOTE — DISCHARGE PLANNING
SW notified by RN that Pt has passed away.    SW met with family and provide information and support.    Family has Chosen   [facility]  Home in [place]  [phone]    SW gave family the In Difficult Times Brochure.    SW updated RN on family [employer].

## 2019-10-02 NOTE — ED NOTES
Pt having another episode as described as this morning with family. MD at bedside, Pt's children reiterated the DNR status and to keep her comfortable.

## 2019-10-02 NOTE — ED NOTES
Med Rec Updated and Complete per Pt, family at bedside, and home pharmacy  Allergies Reviewed  No PO ABX last 14 days.

## 2019-10-02 NOTE — ED NOTES
tech from Lab called with critical result of lactic acid at 5.0. Critical lab result read back to tech.   Dr. Mcclain notified of critical lab result at 1126.  Critical lab result read back by Dr. Mcclain.

## 2019-10-02 NOTE — ED NOTES
Rounded on pt, discussed POC, updated on wait status, answered questions, addressed needs, ensured call light within reach. Provided emotional support for pt.

## 2019-10-02 NOTE — ED TRIAGE NOTES
Arrived via [facility] from home in [place] following pt bathing and unable to get out of bath tub. Pt reports Hx of Colon CA and treated with Chemo for past 3 months. Since yesterday she has been experiencing weakness and difficulty breathing on exertion. Diarrhea bloody this AM has had diarrhea on and off since beginning Chemo.

## 2019-10-02 NOTE — ED PROVIDER NOTES
ED Provider Note    Scribed for Andrey Mcclain M.D. by Bradley Garrison. 10/2/2019  10:25 AM    Primary care provider: None noted  Means of arrival: Ambulance  History obtained from: Patient  History limited by: None    CHIEF COMPLAINT  Chief Complaint   Patient presents with   • Weakness   • Difficulty Breathing     on exertion     HPI  Mirna Black is a 69 y.o. female with a history of colon cancer who presents to the Emergency Department by ambulance for increasing generalized weakness onset last night. She notes she was sitting in the bathtub last night and she was having a great amount of difficulty getting out by herself. She reports associated symptoms of difficulty breathing upon exertion, intermittent diarrhea since starting chemotherapy, some blood in her diarrhea since last night and dizziness. She has not been able to ingest fluids lately. She denies any vomiting, fever, chest pain, no acute abdominal pain, hematuria, dysuria. She is not on any supplemental oxygen. Her last dose of chemotherapy was 1 week ago. She does not have a colostomy.     REVIEW OF SYSTEMS  Pertinent positives include generalized weakness, difficulty breathing, diarrhea, hematochezia, dizziness.   Pertinent negatives include no vomiting, fever, chest pain, abdominal pain, hematuria, dysuria.    All other systems reviewed and negative.    PAST MEDICAL HISTORY   has a past medical history of MUNIR (acute kidney injury) (HCC) (9/13/2019), Colon cancer metastasized to intra-abdominal lymph node (HCC) (7/2/2019), and Hypertension.    SURGICAL HISTORY   has a past surgical history that includes tumor excision with biopsy (wrapped around carotid in brain); tonsillectomy; cholecystectomy; appendectomy; and other.    SOCIAL HISTORY  Social History     Tobacco Use   • Smoking status: Former Smoker     Types: Cigarettes   • Smokeless tobacco: Never Used   • Tobacco comment: 5  years ago   Substance Use Topics   • Alcohol use: Yes      Comment: socially   • Drug use: No      Social History     Substance and Sexual Activity   Drug Use No     FAMILY HISTORY  Family History   Problem Relation Age of Onset   • Heart Disease Father         AAA   • Heart Disease Mother    • Stroke Sister    • Heart Disease Brother         bi-pass     CURRENT MEDICATIONS  Home Medications     Reviewed by Jero Mattson (Pharmacy Tech) on 10/02/19 at 1420  Med List Status: Complete   Medication Last Dose Status   HYDROcodone-acetaminophen (NORCO) 7.5-325 MG per tablet 10/1/2019 Active   loperamide (IMODIUM) 2 MG Cap <week Active   magnesium oxide (MAG-OX) 400 MG Tab 10/1/2019 Active   mirtazapine (REMERON) 30 MG Tab tablet 10/1/2019 Active   Morphine Sulfate ER 15 MG Tablet Extended Release Abuse-Deterrent 10/1/2019 Active   ondansetron (ZOFRAN ODT) 8 MG TABLET DISPERSIBLE 10/1/2019 Active   prochlorperazine (COMPAZINE) 5 MG Tab <week Active   vitamin D, Ergocalciferol, (DRISDOL) 18019 units Cap capsule 10/1/2019 Active              ALLERGIES  Allergies   Allergen Reactions   • Food Shortness of Breath and Swelling     Walnuts     • Pcn [Penicillins] Anaphylaxis and Swelling     PHYSICAL EXAM  VITAL SIGNS: BP (!) 75/55   Pulse (!) 110   Temp 36.3 °C (97.3 °F) (Temporal)   Resp 18   Ht 1.524 m (5')   Wt 49.9 kg (110 lb)   SpO2 100%   BMI 21.48 kg/m²     Constitutional: Well developed, Well nourished, Non-toxic appearance. Ill appearing.  HENT: Normocephalic, Atraumatic, Bilateral external ears normal, No oral exudates, Dry mucous membranes.  Eyes: PERRLA, EOMI, Conjunctiva normal, No discharge.   Neck: No tenderness, Supple, No stridor.   Lymphatic: No lymphadenopathy noted.   Cardiovascular: Normal heart rate, Normal rhythm.   Thorax & Lungs: Clear to auscultation bilaterally, No respiratory distress, No wheezing, No crackles.   Abdomen: Soft, No tenderness, Large masses in epigastric area that are tender to palpation.  Skin: Pale, Dry, No erythema, No  rash.   Extremities:, No edema No cyanosis.   Musculoskeletal: No tenderness to palpation or major deformities noted.  Intact distal pulses  Neurologic: Awake, alert. Moves all extremities spontaneously.  Psychiatric: Affect normal, Judgment normal, Mood normal.     LABS  Results for orders placed or performed during the hospital encounter of 10/02/19   LACTIC ACID   Result Value Ref Range    Lactic Acid 5.0 (HH) 0.5 - 2.0 mmol/L   CBC WITH DIFFERENTIAL   Result Value Ref Range    WBC 6.9 4.8 - 10.8 K/uL    RBC 2.61 (L) 4.20 - 5.40 M/uL    Hemoglobin 8.1 (L) 12.0 - 16.0 g/dL    Hematocrit 25.9 (L) 37.0 - 47.0 %    MCV 99.2 (H) 81.4 - 97.8 fL    MCH 31.0 27.0 - 33.0 pg    MCHC 31.3 (L) 33.6 - 35.0 g/dL    RDW 66.6 (H) 35.9 - 50.0 fL    Platelet Count 158 (L) 164 - 446 K/uL    MPV 10.3 9.0 - 12.9 fL    Neutrophils-Polys 82.70 (H) 44.00 - 72.00 %    Lymphocytes 12.40 (L) 22.00 - 41.00 %    Monocytes 3.20 0.00 - 13.40 %    Eosinophils 0.00 0.00 - 6.90 %    Basophils 0.40 0.00 - 1.80 %    Immature Granulocytes 1.30 (H) 0.00 - 0.90 %    Nucleated RBC 0.30 /100 WBC    Neutrophils (Absolute) 5.67 2.00 - 7.15 K/uL    Lymphs (Absolute) 0.85 (L) 1.00 - 4.80 K/uL    Monos (Absolute) 0.22 0.00 - 0.85 K/uL    Eos (Absolute) 0.00 0.00 - 0.51 K/uL    Baso (Absolute) 0.03 0.00 - 0.12 K/uL    Immature Granulocytes (abs) 0.09 0.00 - 0.11 K/uL    NRBC (Absolute) 0.02 K/uL    Anisocytosis 2+    COMP METABOLIC PANEL   Result Value Ref Range    Sodium 137 135 - 145 mmol/L    Potassium 3.3 (L) 3.6 - 5.5 mmol/L    Chloride 97 96 - 112 mmol/L    Co2 20 20 - 33 mmol/L    Anion Gap 20.0 (H) 0.0 - 11.9    Glucose 145 (H) 65 - 99 mg/dL    Bun 30 (H) 8 - 22 mg/dL    Creatinine 0.51 0.50 - 1.40 mg/dL    Calcium 8.1 (L) 8.5 - 10.5 mg/dL    AST(SGOT) 15 12 - 45 U/L    ALT(SGPT) 5 2 - 50 U/L    Alkaline Phosphatase 87 30 - 99 U/L    Total Bilirubin 0.3 0.1 - 1.5 mg/dL    Albumin 3.3 3.2 - 4.9 g/dL    Total Protein 5.3 (L) 6.0 - 8.2 g/dL    Globulin  2.0 1.9 - 3.5 g/dL    A-G Ratio 1.7 g/dL   ESTIMATED GFR   Result Value Ref Range    GFR If African American >60 >60 mL/min/1.73 m 2    GFR If Non African American >60 >60 mL/min/1.73 m 2   PERIPHERAL SMEAR REVIEW   Result Value Ref Range    Peripheral Smear Review see below    PLATELET ESTIMATE   Result Value Ref Range    Plt Estimation Normal    MORPHOLOGY   Result Value Ref Range    RBC Morphology Present     Poikilocytosis 1+     Ovalocytes 1+    DIFFERENTIAL COMMENT   Result Value Ref Range    Comments-Diff see below       RADIOLOGY  DX-CHEST-PORTABLE (1 VIEW)   Final Result      Linear atelectasis within the left lung base.      The radiologist's interpretation of all radiological studies have been reviewed by me.    COURSE & MEDICAL DECISION MAKING  Pertinent Labs & Imaging studies reviewed. (See chart for details)    I reviewed the patient's medical records which showed a history of colon cancer and chemotherapy, with her last chemotherapy a week ago. She was recently discharged from the hospital on 9/20 for nausea, vomiting and diarrhea.     10:25 AM - Patient seen and examined at bedside. Patient will be treated with EMLA 2.5 %, Lactated ringers bolus. Ordered DX-Chest, Lactic Acid, CBC with Diff, CMP, Urinalysis, Urine Culture, Blood Culture to evaluate her symptoms. The differential diagnoses include but are not limited to: sepsis, dehydration, neutropenia. The patient verbalizes agreement and understands my plan of care.    12:15 PM- ordered Norco , Maxipime 2 g/  mL, Vancocin 1,200 mg/ mL.    12:36 PM - I spoke with Dr. Hanna (Hospitalist) in the ED about patient's work-up thus far, and he agrees to admit patient. Patient's plan of care has been transferred at this time.    2:19 PM- Patient was reevaluated at bedside.  Patient is in margarita-arrest, family wants comfort care, I will order morphine 2 mg, Ativan 0.5 mg injection.     Time of death at 254    Discussed patient condition  and risk of morbidity and/or mortality with patient.   The patient remains critically ill with a poor prognosis.   Critical care time = 35 minutes in directly providing and coordinating critical care and extensive data review. No time overlap and excludes procedures.     Decision Making:  Patient is coming in secondary to global weakness, she is tachycardic, hypotensive, the patient was given fluid resuscitation.  Was given prophylactic antibiotics for presumed sepsis, could not find a source for her sepsis.  The patient has dropped her hemoglobin, the patient seemed to stabilize somewhat, discussed the case with Dr. Nisreen Velasquez, once Dr. Nisreen Velasquez evaluated the patient and the patient was impairing her rest, had shortness of breath, became pale.  At that point in time the patient's blood pressure was extremely low, the patient had agonal respirations and decreased mental status.  The patient is a DO NOT RESUSCITATE.  Patient was given morphine and Ativan for comfort measures, soon thereafter the patient was pronounced dead.    HTN/IDDM FOLLOW UP:  The patient is referred to a primary physician for blood pressure management, diabetic screening, and for all other preventive health concerns    DISPOSITION:  To the [facility]    FINAL IMPRESSION  1. Weakness    2. Dehydration    3. Hypotension, unspecified hypotension type       Critical Care Time of 35 minutes excluding procedures.    Bradley ASHLEY (Scribe), am scribing for, and in the presence of, Andrey Mcclain M.D..    Electronically signed by: Bradley Garrison (Ann Marie), 10/2/2019    Andrey ASHLEY M.D. personally performed the services described in this documentation, as scribed by Bradley Garrison in my presence, and it is both accurate and complete.    C    The note accurately reflects work and decisions made by me.  Andrey Mcclain  10/2/2019  3:53 PM

## 2019-10-02 NOTE — ED NOTES
"Pt expresses wishes to cease chemo. Pt's son asking about who they can talk to about what happens next. Things such as \"hospice\" This RN asked Pt and family present their feelings and wishes. SW consult requested for resources and possible Hospice referral.   "

## 2019-10-02 NOTE — DISCHARGE PLANNING
Care Transition Team Assessment    Patient states she has advance directives.  Support are her children.   is her daughter, Shannan Hector ([phone]).  Shannan will provide ride home upon discharge.    Patient under the care of Dr. Casillas for her colon cancer.  Patient has expressed wishes to perhaps discontinue chemotherapy.  She wishes to discuss this with Dr. Casillas at next appointment.  Patient did express interest in Hospice.  Hospice discussed with patient and choice form presented, but she would like to make this decision after discussion with Dr. Casillas.      Patient has a planned trip to [place] with her daughters with flight booked for October 6th.  Plan is to beef her up and discharge for this trip.    Scripts may be called into the [facility] on [address].  No discharge needs anticipated at this time.         Information Source  Orientation : Oriented x 4  Information Given By: Patient  Informant's Name: Mirna Black  Who is responsible for making decisions for patient? : Patient    Elopement Risk  Legal Hold: No    Interdisciplinary Discharge Planning  Does Admitting Nurse Feel This Could be a Complex Discharge?: No  Primary Care Physician: (Lance Ashley MD)  Lives with - Patient's Self Care Capacity: Alone and Able to Care For Self  Support Systems: Children  Housing / Facility: [address]  Do You Take your Prescribed Medications Regularly: Yes  Able to Return to Previous ADL's: Yes  Mobility Issues: No  Prior Services: None  Patient Expects to be Discharged to:: (Home)  Assistance Needed: Unknown at this Time  Durable Medical Equipment: Not Applicable    Discharge Preparedness  What is your plan after discharge?: Home with help  What are your discharge supports?: Child  Prior Functional Level: Independent with Activities of Daily Living    Functional Assesment  Prior Functional Level: Independent with Activities of Daily Living    Finances  Financial Barriers to  Discharge: No  Prescription Coverage: Yes    Advance Directive  Advance Directive?: Living Will, DPOA for Health Care    Domestic Abuse  Have you ever been the victim of abuse or violence?: No    Discharge Risks or Barriers  Discharge risks or barriers?: No    Anticipated Discharge Information  Anticipated discharge disposition: Home  Discharge Address: ([address] [place] [address])  Discharge Contact Phone Number: ([phone])

## 2019-10-07 ENCOUNTER — APPOINTMENT (OUTPATIENT)
Dept: ONCOLOGY | Facility: MEDICAL CENTER | Age: 69
End: 2019-10-07
Attending: INTERNAL MEDICINE
Payer: MEDICARE

## 2019-10-07 LAB
BACTERIA BLD CULT: NORMAL
BACTERIA BLD CULT: NORMAL
SIGNIFICANT IND 70042: NORMAL
SIGNIFICANT IND 70042: NORMAL
SITE SITE: NORMAL
SITE SITE: NORMAL
SOURCE SOURCE: NORMAL
SOURCE SOURCE: NORMAL

## 2019-10-09 ENCOUNTER — APPOINTMENT (OUTPATIENT)
Dept: ONCOLOGY | Facility: MEDICAL CENTER | Age: 69
End: 2019-10-09
Attending: INTERNAL MEDICINE
Payer: MEDICARE

## 2024-01-30 NOTE — PROGRESS NOTES
[facility] Medicine Daily Progress Note    Date of Service  9/19/2019    Chief Complaint  69 y.o. female admitted 9/13/2019 with intractable nausea, vomiting, diarrhea in the setting of nonoperable metastatic recurrent colon cancer    Hospital Course    Patient is a pleasant 69-year-old female who presented with intractable nausea vomiting and diarrhea with a reported 16 pound weight loss over the last 5 weeks.  She has recently started chemotherapy for her colon cancer and last received a dose 8 days prior to presentation.  She describes mucus in her throat that she has difficulty clearing and when she drinks water this thins the secretions and stomach fluids such that she develops projectile vomiting.  She has ongoing stomach pain from metastatic masses off of the stomach.  She follows with Dr. Brown.    Interval Problem Update  9/17: Feels better this morning.  Did not have any nausea or vomiting this morning.  Tolerating p.o. fairly.  Still have loose stools but more formed now.  No abdominal pain.  Afebrile overnight.  No issues overnight per staff.  9/18: Resting comfortably in bed.  Stated that she was in pain all last night.  Vomited once last night after a meal.  No nausea or vomiting this morning.  Tolerating p.o. intake fairly.  Bowel movements still loose.  No melena or hematochezia.  Afebrile overnight.  No other issues to report.  9/19: Resting comfortably in bed.  No nausea or vomiting reported this morning.  Still having diarrhea.  Refused Imodium this morning but then agreed to use it.  Feels stronger.  No distress noted.  Consultants/Specialty  Palliative    Code Status  Changed 9/14/2019 to DNR/DNI    Disposition  Home once medically cleared.    Review of Systems  Review of Systems   Constitutional: Positive for malaise/fatigue. Negative for chills, diaphoresis, fever and weight loss.   HENT: Negative for ear discharge, ear pain, hearing loss and tinnitus.    Eyes: Negative for blurred vision,  double vision, photophobia, pain and discharge.   Respiratory: Negative for cough, hemoptysis and sputum production.    Cardiovascular: Negative for chest pain, palpitations, orthopnea and claudication.   Gastrointestinal: Positive for diarrhea (Improving) and nausea (Improving). Negative for abdominal pain, constipation, heartburn and vomiting.   Genitourinary: Negative for dysuria, frequency, hematuria and urgency.   Musculoskeletal: Negative for back pain, joint pain, myalgias and neck pain.   Skin: Negative for rash.   Neurological: Negative for dizziness, tingling, tremors, sensory change and headaches.        Physical Exam  Temp:  [36 °C (96.8 °F)-37.3 °C (99.1 °F)] 36 °C (96.8 °F)  Pulse:  [71-87] 87  Resp:  [16-18] 16  BP: (124-143)/(53-69) 124/53  SpO2:  [98 %-100 %] 98 %    Physical Exam   Constitutional: She is oriented to person, place, and time. Vital signs are normal. She appears cachectic. No distress.   Frail   HENT:   Head: Normocephalic and atraumatic.   Eyes: Pupils are equal, round, and reactive to light. EOM are normal. Right eye exhibits no discharge. Left eye exhibits no discharge.   Neck: Neck supple. No tracheal deviation present. No thyromegaly present.   Cardiovascular: Normal rate and regular rhythm. Exam reveals no gallop, no S3 and no S4.   Murmur heard.   Systolic murmur is present with a grade of 1/6.  Pulmonary/Chest: Effort normal and breath sounds normal. No respiratory distress. She has no wheezes.   Abdominal: Soft. She exhibits mass. She exhibits no distension and no ascites. Bowel sounds are increased. There is no rebound and no guarding.   Musculoskeletal: She exhibits no tenderness or deformity.   Neurological: She is alert and oriented to person, place, and time. No cranial nerve deficit. Coordination normal.   Skin: Skin is warm and dry. She is not diaphoretic.   Psychiatric: She has a normal mood and affect. Her behavior is normal. Judgment and thought content normal.        Fluids    Intake/Output Summary (Last 24 hours) at 9/19/2019 1647  Last data filed at 9/19/2019 1525  Gross per 24 hour   Intake 480 ml   Output --   Net 480 ml       Laboratory  Recent Labs     09/17/19 0235 09/18/19  0300 09/19/19  0345   WBC 7.5 6.0 5.0   RBC 3.76* 3.61* 3.57*   HEMOGLOBIN 11.4* 10.8* 11.1*   HEMATOCRIT 33.6* 32.5* 32.2*   MCV 89.4 90.0 90.2   MCH 30.3 29.9 31.1   MCHC 33.9 33.2* 34.5   RDW 59.8* 61.7* 63.7*   PLATELETCT 240 226 255   MPV 9.3 9.6 9.4     Recent Labs     09/17/19 0235 09/18/19  0300 09/19/19  0345   SODIUM 137 133* 136   POTASSIUM 3.3* 3.2* 3.7   CHLORIDE 110 106 110   CO2 17* 18* 18*   GLUCOSE 68 76 69   BUN 4* 3* <3*   CREATININE 0.51 0.39* 0.41*   CALCIUM 7.8* 7.4* 7.4*                   Imaging  No orders to display        Assessment/Plan  * Nausea vomiting and diarrhea- (present on admission)  Assessment & Plan  Likely chemo induced  Continue with fluid resuscitation along with as needed medications for nausea and vomiting.  PRN diarrhea medications as well.  9/19: Advance diet as tolerated.  Still having loose stools.  Refused Imodium this morning but then agreed to try it.    Abdominal mass, LUQ (left upper quadrant)- (present on admission)  Assessment & Plan  Known metastatic mass to the stomach        Anemia- (present on admission)  Assessment & Plan  Multifactorial  Likely from chemotherapy inuced myelosuppression and colon/abdominal bleeding  Daily monitoring   SCDs  Transfuse Hgb <7    Hypomagnesemia- (present on admission)  Assessment & Plan  Replaced  Continue to monitor    DNR (do not resuscitate)  Assessment & Plan  Confirmed by patient.    High anion gap metabolic acidosis- (present on admission)  Assessment & Plan  Resolved with IV hydration    Hypokalemia- (present on admission)  Assessment & Plan  On replacement  Continue to monitor  9/18: Still not corrected. Continue supplements. Will check Mg levels.     MUNIR (acute kidney injury) (HCC)- (present on  Detail Level: Detailed admission)  Assessment & Plan  This is prerenal in etiology due to Gi losses, N/V/D  Resolved with IV hydration    Hyponatremia- (present on admission)  Assessment & Plan  This Is hypovolemic   Cont with IVF and recheck labs in am     Colon cancer metastasized to intra-abdominal lymph node (HCC)- (present on admission)  Assessment & Plan  Stage IV  last chemo 9/2/2019 complicated by GI symptoms.   Plan of care discussed with multidisciplinary team during rounds.    VTE prophylaxis: SCDs

## 2024-04-08 NOTE — CARE PLAN
Problem: Safety  Goal: Will remain free from injury  Outcome: PROGRESSING AS EXPECTED  Intervention: Provide assistance with mobility  Flowsheets (Taken 9/13/2019 2242)  Assistance: Assistance of One  Ambulation Tolerance: Tolerates Well  Note:   Anticipate patient's needs. Keep belongings within reach.     Problem: Pain Management  Goal: Pain level will decrease to patient's comfort goal  Outcome: PROGRESSING AS EXPECTED  Flowsheets (Taken 9/13/2019 2242)  Non Verbal Scale : Tense Body Language  Velásquez-Baker Scale : 6   Pain Rating Scale (NPRS): 5  Note:   Administer prescribed pain meds.      [NL] : no acute distress, alert [Erythematous Oropharynx] : nonerythematous oropharynx [de-identified] : right upper second molar with cavity